# Patient Record
Sex: MALE | Race: WHITE | NOT HISPANIC OR LATINO | Employment: OTHER | URBAN - NONMETROPOLITAN AREA
[De-identification: names, ages, dates, MRNs, and addresses within clinical notes are randomized per-mention and may not be internally consistent; named-entity substitution may affect disease eponyms.]

---

## 2018-05-24 ENCOUNTER — OFFICE VISIT CONVERTED (OUTPATIENT)
Dept: FAMILY MEDICINE CLINIC | Age: 73
End: 2018-05-24
Attending: FAMILY MEDICINE

## 2018-11-16 ENCOUNTER — OFFICE VISIT CONVERTED (OUTPATIENT)
Dept: FAMILY MEDICINE CLINIC | Age: 73
End: 2018-11-16
Attending: FAMILY MEDICINE

## 2019-05-09 ENCOUNTER — HOSPITAL ENCOUNTER (OUTPATIENT)
Dept: SURGERY | Facility: HOSPITAL | Age: 74
Setting detail: HOSPITAL OUTPATIENT SURGERY
Discharge: HOME | End: 2019-05-09
Attending: OPHTHALMOLOGY

## 2019-05-29 ENCOUNTER — OFFICE VISIT CONVERTED (OUTPATIENT)
Dept: FAMILY MEDICINE CLINIC | Age: 74
End: 2019-05-29
Attending: NURSE PRACTITIONER

## 2019-05-29 ENCOUNTER — HOSPITAL ENCOUNTER (OUTPATIENT)
Dept: OTHER | Facility: HOSPITAL | Age: 74
Discharge: HOME | End: 2019-05-29

## 2019-06-04 ENCOUNTER — HOSPITAL ENCOUNTER (OUTPATIENT)
Dept: PHYSICAL THERAPY | Facility: CLINIC | Age: 74
Setting detail: RECURRING SERIES
Discharge: HOME | End: 2019-07-16

## 2019-06-27 ENCOUNTER — HOSPITAL ENCOUNTER (OUTPATIENT)
Dept: OTHER | Facility: HOSPITAL | Age: 74
Discharge: HOME | End: 2019-06-27
Attending: FAMILY MEDICINE

## 2019-06-27 ENCOUNTER — OFFICE VISIT CONVERTED (OUTPATIENT)
Dept: FAMILY MEDICINE CLINIC | Age: 74
End: 2019-06-27
Attending: FAMILY MEDICINE

## 2019-06-27 LAB
ALT SERPL-CCNC: 36 U/L (ref 10–40)
CHOLEST SERPL-MCNC: 130 MG/DL (ref 107–200)
CHOLEST/HDLC SERPL: 3.3 {RATIO} (ref 3–6)
EST. AVERAGE GLUCOSE BLD GHB EST-MCNC: 97 MG/DL
HBA1C MFR BLD: 5 % (ref 3.5–5.7)
HDLC SERPL-MCNC: 39 MG/DL (ref 40–60)
LDLC SERPL CALC-MCNC: 74 MG/DL (ref 70–100)
PSA SERPL-MCNC: 5.43 NG/ML (ref 0–4)
TRIGL SERPL-MCNC: 85 MG/DL (ref 40–150)
VLDLC SERPL-MCNC: 17 MG/DL (ref 5–37)

## 2019-09-05 ENCOUNTER — HOSPITAL ENCOUNTER (OUTPATIENT)
Dept: SURGERY | Facility: HOSPITAL | Age: 74
Setting detail: HOSPITAL OUTPATIENT SURGERY
Discharge: HOME | End: 2019-09-05
Attending: OPHTHALMOLOGY

## 2019-10-30 ENCOUNTER — HOSPITAL ENCOUNTER (OUTPATIENT)
Dept: OTHER | Facility: HOSPITAL | Age: 74
Discharge: HOME | End: 2019-10-30
Attending: FAMILY MEDICINE

## 2019-10-30 LAB
ALBUMIN SERPL-MCNC: 4.1 G/DL (ref 3.5–5)
ALBUMIN/GLOB SERPL: 1.6 {RATIO} (ref 1.4–2.6)
ALP SERPL-CCNC: 71 U/L (ref 56–155)
ALT SERPL-CCNC: 32 U/L (ref 10–40)
ANION GAP SERPL CALC-SCNC: 17 MMOL/L (ref 8–19)
AST SERPL-CCNC: 30 U/L (ref 15–50)
BILIRUB SERPL-MCNC: 0.76 MG/DL (ref 0.2–1.3)
BUN SERPL-MCNC: 15 MG/DL (ref 5–25)
BUN/CREAT SERPL: 15 {RATIO} (ref 6–20)
CALCIUM SERPL-MCNC: 10.1 MG/DL (ref 8.7–10.4)
CHLORIDE SERPL-SCNC: 103 MMOL/L (ref 99–111)
CONV CO2: 26 MMOL/L (ref 22–32)
CONV TOTAL PROTEIN: 6.7 G/DL (ref 6.3–8.2)
CREAT UR-MCNC: 1.03 MG/DL (ref 0.7–1.2)
GFR SERPLBLD BASED ON 1.73 SQ M-ARVRAT: >60 ML/MIN/{1.73_M2}
GLOBULIN UR ELPH-MCNC: 2.6 G/DL (ref 2–3.5)
GLUCOSE SERPL-MCNC: 97 MG/DL (ref 70–99)
OSMOLALITY SERPL CALC.SUM OF ELEC: 295 MOSM/KG (ref 273–304)
POTASSIUM SERPL-SCNC: 3.7 MMOL/L (ref 3.5–5.3)
SODIUM SERPL-SCNC: 142 MMOL/L (ref 135–147)

## 2019-10-31 LAB
PSA FREE MFR SERPL: 19.3 %
PSA FREE SERPL-MCNC: 0.87 NG/ML
PSA SERPL-MCNC: 4.5 NG/ML (ref 0–4)

## 2019-12-13 ENCOUNTER — OFFICE VISIT CONVERTED (OUTPATIENT)
Dept: FAMILY MEDICINE CLINIC | Age: 74
End: 2019-12-13
Attending: FAMILY MEDICINE

## 2020-06-08 ENCOUNTER — OFFICE VISIT CONVERTED (OUTPATIENT)
Dept: FAMILY MEDICINE CLINIC | Age: 75
End: 2020-06-08
Attending: FAMILY MEDICINE

## 2020-06-11 ENCOUNTER — HOSPITAL ENCOUNTER (OUTPATIENT)
Dept: OTHER | Facility: HOSPITAL | Age: 75
Discharge: HOME | End: 2020-06-11
Attending: FAMILY MEDICINE

## 2020-06-11 LAB
ALBUMIN SERPL-MCNC: 4.2 G/DL (ref 3.5–5)
ALBUMIN/GLOB SERPL: 1.4 {RATIO} (ref 1.4–2.6)
ALP SERPL-CCNC: 73 U/L (ref 56–155)
ALT SERPL-CCNC: 29 U/L (ref 10–40)
ANION GAP SERPL CALC-SCNC: 20 MMOL/L (ref 8–19)
AST SERPL-CCNC: 29 U/L (ref 15–50)
BILIRUB SERPL-MCNC: 0.84 MG/DL (ref 0.2–1.3)
BUN SERPL-MCNC: 19 MG/DL (ref 5–25)
BUN/CREAT SERPL: 18 {RATIO} (ref 6–20)
CALCIUM SERPL-MCNC: 9.9 MG/DL (ref 8.7–10.4)
CHLORIDE SERPL-SCNC: 108 MMOL/L (ref 99–111)
CHOLEST SERPL-MCNC: 130 MG/DL (ref 107–200)
CHOLEST/HDLC SERPL: 3.4 {RATIO} (ref 3–6)
CONV CO2: 22 MMOL/L (ref 22–32)
CONV TOTAL PROTEIN: 7.1 G/DL (ref 6.3–8.2)
CREAT UR-MCNC: 1.06 MG/DL (ref 0.7–1.2)
ERYTHROCYTE [DISTWIDTH] IN BLOOD BY AUTOMATED COUNT: 12.7 % (ref 11.5–14.5)
GFR SERPLBLD BASED ON 1.73 SQ M-ARVRAT: >60 ML/MIN/{1.73_M2}
GLOBULIN UR ELPH-MCNC: 2.9 G/DL (ref 2–3.5)
GLUCOSE SERPL-MCNC: 108 MG/DL (ref 70–99)
HBA1C MFR BLD: 16.4 G/DL (ref 14–18)
HCT VFR BLD AUTO: 48.4 % (ref 42–52)
HDLC SERPL-MCNC: 38 MG/DL (ref 40–60)
LDLC SERPL CALC-MCNC: 76 MG/DL (ref 70–100)
MCH RBC QN AUTO: 32.1 PG (ref 27–31)
MCHC RBC AUTO-ENTMCNC: 33.9 G/DL (ref 33–37)
MCV RBC AUTO: 94.7 FL (ref 80–96)
OSMOLALITY SERPL CALC.SUM OF ELEC: 305 MOSM/KG (ref 273–304)
PLATELET # BLD AUTO: 203 10*3/UL (ref 130–400)
PMV BLD AUTO: 10.8 FL (ref 7.4–10.4)
POTASSIUM SERPL-SCNC: 4.2 MMOL/L (ref 3.5–5.3)
PSA SERPL-MCNC: 4.92 NG/ML (ref 0–4)
RBC # BLD AUTO: 5.11 10*6/UL (ref 4.7–6.1)
SODIUM SERPL-SCNC: 146 MMOL/L (ref 135–147)
TRIGL SERPL-MCNC: 82 MG/DL (ref 40–150)
VLDLC SERPL-MCNC: 16 MG/DL (ref 5–37)
WBC # BLD AUTO: 5.42 10*3/UL (ref 4.8–10.8)

## 2020-06-12 LAB
EST. AVERAGE GLUCOSE BLD GHB EST-MCNC: 111 MG/DL
HBA1C MFR BLD: 5.5 % (ref 3.5–5.7)

## 2020-11-23 ENCOUNTER — HOSPITAL ENCOUNTER (OUTPATIENT)
Dept: OTHER | Facility: HOSPITAL | Age: 75
Discharge: HOME | End: 2020-11-23
Attending: FAMILY MEDICINE

## 2020-11-23 ENCOUNTER — OFFICE VISIT CONVERTED (OUTPATIENT)
Dept: FAMILY MEDICINE CLINIC | Age: 75
End: 2020-11-23
Attending: FAMILY MEDICINE

## 2020-11-23 LAB
ALBUMIN SERPL-MCNC: 4.1 G/DL (ref 3.5–5)
ALBUMIN/GLOB SERPL: 1.5 {RATIO} (ref 1.4–2.6)
ALP SERPL-CCNC: 66 U/L (ref 56–155)
ALT SERPL-CCNC: 45 U/L (ref 10–40)
ANION GAP SERPL CALC-SCNC: 19 MMOL/L (ref 8–19)
AST SERPL-CCNC: 34 U/L (ref 15–50)
BILIRUB SERPL-MCNC: 0.65 MG/DL (ref 0.2–1.3)
BUN SERPL-MCNC: 13 MG/DL (ref 5–25)
BUN/CREAT SERPL: 13 {RATIO} (ref 6–20)
CALCIUM SERPL-MCNC: 9.4 MG/DL (ref 8.7–10.4)
CHLORIDE SERPL-SCNC: 102 MMOL/L (ref 99–111)
CHOLEST SERPL-MCNC: 130 MG/DL (ref 107–200)
CHOLEST/HDLC SERPL: 3.2 {RATIO} (ref 3–6)
CONV CO2: 24 MMOL/L (ref 22–32)
CONV TOTAL PROTEIN: 6.8 G/DL (ref 6.3–8.2)
CREAT UR-MCNC: 1.04 MG/DL (ref 0.7–1.2)
GFR SERPLBLD BASED ON 1.73 SQ M-ARVRAT: >60 ML/MIN/{1.73_M2}
GLOBULIN UR ELPH-MCNC: 2.7 G/DL (ref 2–3.5)
GLUCOSE SERPL-MCNC: 109 MG/DL (ref 70–99)
HDLC SERPL-MCNC: 41 MG/DL (ref 40–60)
LDLC SERPL CALC-MCNC: 64 MG/DL (ref 70–100)
OSMOLALITY SERPL CALC.SUM OF ELEC: 293 MOSM/KG (ref 273–304)
POTASSIUM SERPL-SCNC: 3.8 MMOL/L (ref 3.5–5.3)
PSA SERPL-MCNC: 5.25 NG/ML (ref 0–4)
SODIUM SERPL-SCNC: 141 MMOL/L (ref 135–147)
TRIGL SERPL-MCNC: 123 MG/DL (ref 40–150)
VLDLC SERPL-MCNC: 25 MG/DL (ref 5–37)

## 2020-11-24 LAB
EST. AVERAGE GLUCOSE BLD GHB EST-MCNC: 128 MG/DL
HBA1C MFR BLD: 6.1 % (ref 3.5–5.7)

## 2021-05-13 ENCOUNTER — OFFICE VISIT CONVERTED (OUTPATIENT)
Dept: FAMILY MEDICINE CLINIC | Age: 76
End: 2021-05-13
Attending: FAMILY MEDICINE

## 2021-05-13 ENCOUNTER — HOSPITAL ENCOUNTER (OUTPATIENT)
Dept: OTHER | Facility: HOSPITAL | Age: 76
Discharge: HOME | End: 2021-05-13
Attending: FAMILY MEDICINE

## 2021-05-13 LAB
ALBUMIN SERPL-MCNC: 4.2 G/DL (ref 3.5–5)
ALBUMIN/GLOB SERPL: 1.5 {RATIO} (ref 1.4–2.6)
ALP SERPL-CCNC: 59 U/L (ref 56–155)
ALT SERPL-CCNC: 38 U/L (ref 10–40)
ANION GAP SERPL CALC-SCNC: 19 MMOL/L (ref 8–19)
AST SERPL-CCNC: 36 U/L (ref 15–50)
BILIRUB SERPL-MCNC: 0.61 MG/DL (ref 0.2–1.3)
BUN SERPL-MCNC: 13 MG/DL (ref 5–25)
BUN/CREAT SERPL: 12 {RATIO} (ref 6–20)
CALCIUM SERPL-MCNC: 9.3 MG/DL (ref 8.7–10.4)
CHLORIDE SERPL-SCNC: 106 MMOL/L (ref 99–111)
CONV CO2: 24 MMOL/L (ref 22–32)
CONV TOTAL PROTEIN: 7 G/DL (ref 6.3–8.2)
CREAT UR-MCNC: 1.13 MG/DL (ref 0.7–1.2)
ERYTHROCYTE [DISTWIDTH] IN BLOOD BY AUTOMATED COUNT: 12.6 % (ref 11.5–14.5)
GFR SERPLBLD BASED ON 1.73 SQ M-ARVRAT: >60 ML/MIN/{1.73_M2}
GLOBULIN UR ELPH-MCNC: 2.8 G/DL (ref 2–3.5)
GLUCOSE SERPL-MCNC: 111 MG/DL (ref 70–99)
HBA1C MFR BLD: 16.1 G/DL (ref 14–18)
HCT VFR BLD AUTO: 46.7 % (ref 42–52)
MCH RBC QN AUTO: 32.8 PG (ref 27–31)
MCHC RBC AUTO-ENTMCNC: 34.5 G/DL (ref 33–37)
MCV RBC AUTO: 95.1 FL (ref 80–96)
OSMOLALITY SERPL CALC.SUM OF ELEC: 301 MOSM/KG (ref 273–304)
PLATELET # BLD AUTO: 180 10*3/UL (ref 130–400)
PMV BLD AUTO: 10.3 FL (ref 7.4–10.4)
POTASSIUM SERPL-SCNC: 3.8 MMOL/L (ref 3.5–5.3)
PSA SERPL-MCNC: 5.93 NG/ML (ref 0–4)
RBC # BLD AUTO: 4.91 10*6/UL (ref 4.7–6.1)
SODIUM SERPL-SCNC: 145 MMOL/L (ref 135–147)
WBC # BLD AUTO: 5.6 10*3/UL (ref 4.8–10.8)

## 2021-05-18 NOTE — PROGRESS NOTES
Magnus Rousseau  1945     Office/Outpatient Visit    Visit Date: Mon, Jun 8, 2020 02:46 pm    Provider: Marco Verdin MD (Assistant: Michelle Soto RN)    Location: [facility]        Electronically signed by Marco Verdin MD on  06/09/2020 07:32:06 AM                             Subjective:        CC: insomnia, blood pressure, cholesterol    HPI:       Magnus is in today for follow up on insomnia.  He has been on Ambien for over 10 years.  He tolerates this well and feels it is necessarily for ongoing function and sleep.  He has tried to do without it previously, but he does not sleep well.    He currently takes 1/2 tablet at night.  He denies side effects.  He denies abuse, diversion, or doctor shopping.  Dutch is okay.          With regard to the mixed hyperlipidemia, current treatment includes Zocor.  Compliance with treatment has been good; he takes his medication as directed and follows up as directed.  He denies experiencing any hypercholesterolemia related symptoms.            Dx with essential (primary) hypertension; his current cardiac medication regimen includes a diuretic ( Hydrodiuril ).  Review of his blood pressure log reveals systolics in the 130s to 140s.  He is tolerating the medication well without side effects.  Compliance with treatment has been good; he takes his medication as directed and follows up as directed.      ROS:     CONSTITUTIONAL:  Negative for chills and fever.      CARDIOVASCULAR:  Negative for chest pain and palpitations.      RESPIRATORY:  Negative for recent cough and dyspnea.      GASTROINTESTINAL:  Negative for abdominal pain, nausea and vomiting.      INTEGUMENTARY:  Negative for atypical mole(s) and rash.          Past Medical History / Family History / Social History:         Last Reviewed on 6/08/2020 03:09 PM by Marco Verdin    Past Medical History:             PAST MEDICAL HISTORY         Hyperlipidemia    Hypertension      "Erectile Dysfunction     Insomnia         CURRENT MEDICAL PROVIDERS:    Dr. Simon - hemorrhoid             ADVANCED DIRECTIVES: None - His wife, Miranda, would make decisions if needed.         PREVENTIVE HEALTH MAINTENANCE             COLORECTAL CANCER SCREENING: Up to date (colonoscopy q10y; sigmoidoscopy q5y; Cologuard q3y) was last done 01/2020, Results are in chart; Cologuard normal         Surgical History:         Appendectomy     Pacemaker Implantation: 06/2019;     Rectal Fistula;         Family History:     Father: Coronary Artery Disease     Mother: Suicide     Brother(s): Cancer (unspecified type)         Social History:     Occupation:    Retired     Marital Status:      Children: 2 children         Tobacco/Alcohol/Supplements:     Last Reviewed on 6/08/2020 03:09 PM by Marco Verdin    Tobacco: He has never smoked.          Alcohol: Frequency:    \"I may drink...one beer a night\";         Substance Abuse History:     Last Reviewed on 6/08/2020 03:09 PM by Marco Verdin    NEGATIVE         Mental Health History:     Last Reviewed on 6/08/2020 03:09 PM by Marco Verdin        Communicable Diseases (eg STDs):     Last Reviewed on 6/08/2020 03:09 PM by Marco Verdin        Current Problems:     Last Reviewed on 6/08/2020 03:09 PM by Marco Verdin    Mixed hyperlipidemia    Essential (primary) hypertension    Other long term (current) drug therapy    Encounter for screening for malignant neoplasm of rectum    Encounter for screening for malignant neoplasm of prostate    Low back pain    Elevated prostate specific antigen [PSA]    Pain in right shoulder    Primary insomnia    Other specified joint disorders, right shoulder    Encounter for screening for malignant neoplasm of colon    Long term (current) use of anticoagulants        Immunizations:     Flulaval 10/13/2010    Fluzone (3 + years dose) 9/26/2011    Fluzone (3 + years dose) 9/21/2012    Fluzone " High-Dose pf (>=65 yr) 10/11/2013    Fluzone High-Dose pf (>=65 yr) 10/23/2014    Fluzone High-Dose pf (>=65 yr) 10/30/2015    Fluzone High-Dose pf (>=65 yr) 10/20/2016    Fluzone High-Dose pf (>=65 yr) 11/6/2017    Fluzone High-Dose pf (>=65 yr) 10/1/2018    Fluzone High-Dose pf (>=65 yr) 10/30/2019    Pneumococcal, 23-valent IM/SC (adult and pt >=2yr) 3/5/2011        Allergies:     Last Reviewed on 6/08/2020 03:09 PM by Marco Verdin    No Known Allergies.        Current Medications:     Last Reviewed on 6/08/2020 03:09 PM by Marco Verdin    zolpidem 10 mg oral tablet [Take 1 tablet(s) by mouth at bedtime prn]    Simvastatin 40 mg oral tablet [Take 1 tablet(s) by mouth at bedtime]    hydroCHLOROthiazide 25 mg oral tablet [Take 1 tablet(s) by mouth daily]    WARFARIN     TAB 2.5MG  [5mg mon/thur...2.5mg rest]    WARFARIN     TAB 5MG  [on Mon/Thur]        Objective:        Vitals:         Current: 6/8/2020 2:51:27 PM    Ht:  5 ft, 8.5 in;  Wt: 209 lbs;  BMI: 31.3T: 97.9 F (oral);  BP: 151/76 mm Hg (right arm, sitting);  P: 79 bpm (right arm (BP Cuff), sitting);  sCr: 1.03 mg/dL;  GFR: 62.51        Repeat:     3:16:38 PM  BP:   145/83mm Hg (right arm, sitting, pulse-75)     Exams:     PHYSICAL EXAM:     GENERAL: Vitals recorded well developed, well nourished;     EYES: extraocular movements intact; conjunctiva and cornea are normal; PERRL;     NECK: range of motion is normal; thyroid is non-palpable;     RESPIRATORY: normal respiratory rate and pattern with no distress; normal breath sounds with no rales, rhonchi, wheezes or rubs;     CARDIOVASCULAR: normal rate; rhythm is regular;  no systolic murmur;     GASTROINTESTINAL: nontender; normal bowel sounds; no masses;     LYMPHATIC: no enlargement of cervical or facial nodes; no supraclavicular nodes;     SKIN:  no significant rashes or lesions; no suspicious moles;     NEUROLOGIC: mental status: alert and oriented x 3; cranial nerves II-XII grossly  intact;     PSYCHIATRIC: appropriate affect and demeanor; normal psychomotor function;         Lab/Test Results:         Urine temperature: confirmed (06/08/2020),     All urine drug screen levels confirmed negative: yes (06/08/2020),     Date and time of last pill: ambien-6/7/20 @ 11pm/david (06/08/2020),     Performed by: pr (06/08/2020),     Collection Time: 15:22 (06/08/2020),             Assessment:         Z79.899   Other long term (current) drug therapy       F51.01   Primary insomnia       E78.2   Mixed hyperlipidemia       I10   Essential (primary) hypertension       Z79.01   Long term (current) use of anticoagulants       R97.20   Elevated prostate specific antigen [PSA]           ORDERS:         Meds Prescribed:       [Refilled] zolpidem 10 mg oral tablet [Take 1 tablet(s) by mouth at bedtime prn], #90 (ninety) tablets, Refills: 0 (zero)       [Refilled] hydroCHLOROthiazide 25 mg oral tablet [Take 1 tablet(s) by mouth daily], #90 (ninety) tablets, Refills: 1 (one)       [Refilled] Simvastatin 40 mg oral tablet [Take 1 tablet(s) by mouth at bedtime], #90 (ninety) tablets, Refills: 1 (one)         Radiology/Test Orders:       3017F  Colorectal CA screen results documented and reviewed (PV)  (In-House)              Lab Orders:       23043  Drug test prsmv qual dir optical obs per day  (In-House)            71094  HTNLP - [facility] CMP AND LIPID: [id number]  (Send-Out)            52911  BDCB2 - [facility] CBC w/o diff  (Send-Out)            *  PRSAS Medicare screening PSA  (Send-Out)              Other Orders:       1101F  Pt screen for fall risk; document no falls in past year or only 1 fall w/o injury in past year (NELDA)  (In-House)                      Plan:         Other long term (current) drug therapy    LABORATORY:  Labs ordered to be performed today include Drug screen.      RECOMMENDATIONS given include: Today, we have reviewed Magnus's care.  I'm going to refill his medications as noted below.  No changes are  anticipated.  We will update labs.  Drug screen today.  I do not see an opportunity for dose reduction presently..  MIPS Has had no falls or only one fall without injury in the past year Vaccines Flu and Pneumonia updated in Shot record Colorectal Cancer Screening is up to date and the results are in the chart           Prescriptions:       [Refilled] zolpidem 10 mg oral tablet [Take 1 tablet(s) by mouth at bedtime prn], #90 (ninety) tablets, Refills: 0 (zero)       [Refilled] hydroCHLOROthiazide 25 mg oral tablet [Take 1 tablet(s) by mouth daily], #90 (ninety) tablets, Refills: 1 (one)       [Refilled] Simvastatin 40 mg oral tablet [Take 1 tablet(s) by mouth at bedtime], #90 (ninety) tablets, Refills: 1 (one)           Orders:       [id number]  Drug test prsmv qual dir optical obs per day  (In-House)            1101F  Pt screen for fall risk; document no falls in past year or only 1 fall w/o injury in past year (NELDA)  (In-House)            3017F  Colorectal CA screen results documented and reviewed (PV)  (In-House)              Primary insomniaAs above.        Mixed hyperlipidemia    LABORATORY:  Labs ordered to be performed today include HTN/Lipid Panel: CMP, Lipid.            Orders:       81817  HTN - [facility] CMP AND LIPID: [id number]  (Send-Out)              Long term (current) use of anticoagulants    LABORATORY:  Labs ordered to be performed today include CBC W/O DIFF.            Orders:       [id number]  BDCB2 - [facility] CBC w/o diff  (Send-Out)              Elevated prostate specific antigen [PSA]    LABORATORY:  Labs ordered to be performed today include PSA.            Orders:       *  PRSAS Medicare screening PSA  (Send-Out)                  Charge Capture:         Primary Diagnosis:     Z79.899  Other long term (current) drug therapy           Orders:      09403  Office/outpatient visit; established patient, level 4  (In-House)            76669  Drug test prsmv qual dir optical obs per day  (In-House)             1101F  Pt screen for fall risk; document no falls in past year or only 1 fall w/o injury in past year (NELDA)  (In-House)            3017F  Colorectal CA screen results documented and reviewed (PV)  (In-House)              F51.01  Primary insomnia     E78.2  Mixed hyperlipidemia     I10  Essential (primary) hypertension     Z79.01  Long term (current) use of anticoagulants     R97.20  Elevated prostate specific antigen [PSA]

## 2021-05-18 NOTE — PROGRESS NOTES
Magnus Rousseau  1945     Office/Outpatient Visit    Visit Date: Mon, Nov 23, 2020 08:53 am    Provider: Marco Verdin MD (Assistant: Breana Polk LPN)    Location: [facility]        Electronically signed by Marco Verdin MD on  11/24/2020 07:38:02 AM                             Subjective:        CC: blood pressure, cholesterol, insomnia, a fib    HPI:           Patient presents with mixed hyperlipidemia.  Current treatment includes Crestor and Zocor.  Compliance with treatment has been good; he takes his medication as directed and follows up as directed.  He denies experiencing any hypercholesterolemia related symptoms.            Concerning essential (primary) hypertension, his current cardiac medication regimen includes a diuretic ( Hydrodiuril ) and a calcium channel blocker ( Norvasc ).  Review of his blood pressure log reveals systolics in the 130s to 140s.  He is tolerating the medication well without side effects.  Compliance with treatment has been good; he takes his medication as directed and follows up as directed.            Magnus is also being seen today for follow up on insomnia.  He has been on Ambien for over 10 years.  He tolerates this well and feels it is necessarily for ongoing function and sleep.  He has tried to do without it previously, but he does not sleep well.    He currently takes 1/2 tablet at night.  He denies side effects.  He denies abuse, diversion, or doctor shopping.  Dutch is okay.          Dx with unspecified atrial fibrillation; he is here for routine follow-up for atrial fibrillation. Current related medications include coumadin.   He is extremely compliant with his medication regimen.            Magnus also has history of elevated PSA and is due for recheck on this presently.    ROS:     CONSTITUTIONAL:  Negative for chills and fever.      CARDIOVASCULAR:  Negative for chest pain and palpitations.      RESPIRATORY:  Negative for recent cough  "and dyspnea.      GASTROINTESTINAL:  Negative for abdominal pain, nausea and vomiting.      INTEGUMENTARY:  Negative for atypical mole(s) and rash.          Past Medical History / Family History / Social History:         Last Reviewed on 11/23/2020 08:59 AM by Marco Verdin    Past Medical History:             PAST MEDICAL HISTORY         Hyperlipidemia    Hypertension     Erectile Dysfunction     Insomnia         CURRENT MEDICAL PROVIDERS:    Dr. Simon - hemorrhoid             ADVANCED DIRECTIVES: None - His wife, Pat, would make decisions if needed.         PREVENTIVE HEALTH MAINTENANCE             COLORECTAL CANCER SCREENING: Up to date (colonoscopy q10y; sigmoidoscopy q5y; Cologuard q3y) was last done 01/2020, Results are in chart; Cologuard normal         Surgical History:         Appendectomy     Pacemaker Implantation: 06/2019;     Rectal Fistula;         Family History:     Father: Coronary Artery Disease     Mother: Suicide     Brother(s): Cancer (unspecified type)         Social History:     Occupation:    Retired     Marital Status:      Children: 2 children         Tobacco/Alcohol/Supplements:     Last Reviewed on 11/23/2020 08:59 AM by Marco Verdin    Tobacco: He has never smoked.          Alcohol: Frequency:    \"I may drink...one beer a night\";         Substance Abuse History:     Last Reviewed on 11/23/2020 08:59 AM by Marco Verdin    NEGATIVE         Mental Health History:     Last Reviewed on 11/23/2020 08:59 AM by Marco Verdin        Communicable Diseases (eg STDs):     Last Reviewed on 11/23/2020 08:59 AM by Marco Verdin        Current Problems:     Last Reviewed on 11/23/2020 08:59 AM by Marco Verdin    Mixed hyperlipidemia    Essential (primary) hypertension    Other long term (current) drug therapy    Encounter for screening for malignant neoplasm of rectum    Encounter for screening for malignant neoplasm of prostate    Low " back pain    Elevated prostate specific antigen [PSA]    Pain in right shoulder    Primary insomnia    Other specified joint disorders, right shoulder    Encounter for screening for malignant neoplasm of colon    Long term (current) use of anticoagulants    Encounter for immunization        Immunizations:     influenza, high-dose, quadrivalent (FLUZONE HIGH-DOSE QUAD 2020-21) 10/1/2020    Flulaval 10/13/2010    Fluzone (3 + years dose) 9/26/2011    Fluzone (3 + years dose) 9/21/2012    Fluzone High-Dose pf (>=65 yr) 10/11/2013    Fluzone High-Dose pf (>=65 yr) 10/23/2014    Fluzone High-Dose pf (>=65 yr) 10/30/2015    Fluzone High-Dose pf (>=65 yr) 10/20/2016    Fluzone High-Dose pf (>=65 yr) 11/6/2017    Fluzone High-Dose pf (>=65 yr) 10/1/2018    Fluzone High-Dose pf (>=65 yr) 10/30/2019    Pneumococcal, 23-valent IM/SC (adult and pt >=2yr) 3/5/2011        Allergies:     Last Reviewed on 11/23/2020 08:59 AM by Marco Verdin    No Known Allergies.        Current Medications:     Last Reviewed on 11/23/2020 08:59 AM by Marco Verdin    zolpidem 10 mg oral tablet [Take 1 tablet(s) by mouth at bedtime prn]    hydroCHLOROthiazide 25 mg oral tablet [Take 1 tablet(s) by mouth daily]    WARFARIN     TAB 2.5MG  [5mg mon/thur...2.5mg rest]    WARFARIN     TAB 5MG  [on Mon/Thur]    rosuvastatin 20 mg oral tablet [take 1 tablet (20 mg) by oral route once daily]    AMLODIPINE   TAB 2.5MG        Objective:        Vitals:         Current: 11/23/2020 8:58:29 AM    Ht:  5 ft, 8.5 in;  Wt: 213.6 lbs;  BMI: 32.0T: 97.1 F (temporal);  BP: 140/77 mm Hg (right arm, sitting);  P: 74 bpm (right arm (BP Cuff), sitting);  sCr: 1.06 mg/dL;  GFR: 61.30        Exams:     PHYSICAL EXAM:     GENERAL: Vitals recorded well developed, well nourished;     EYES: extraocular movements intact; conjunctiva and cornea are normal; PERRL;     E/N/T: EARS:  normal external auditory canals and tympanic membranes;  grossly normal hearing;  OROPHARYNX:  normal mucosa, dentition, gingiva, and posterior pharynx;     NECK: range of motion is normal; thyroid is non-palpable;     RESPIRATORY: normal respiratory rate and pattern with no distress; normal breath sounds with no rales, rhonchi, wheezes or rubs;     CARDIOVASCULAR: normal rate; rhythm is regular;  no systolic murmur;     GASTROINTESTINAL: nontender; normal bowel sounds; no masses;     LYMPHATIC: no enlargement of cervical or facial nodes; no supraclavicular nodes;     SKIN:  no significant rashes or lesions; no suspicious moles;     NEUROLOGIC: mental status: alert and oriented x 3; cranial nerves II-XII grossly intact;     PSYCHIATRIC: appropriate affect and demeanor; normal psychomotor function;         Assessment:         E78.2   Mixed hyperlipidemia       I10   Essential (primary) hypertension       Z79.899   Other long term (current) drug therapy       F51.01   Primary insomnia       I48.91   Unspecified atrial fibrillation       R97.20   Elevated prostate specific antigen [PSA]           ORDERS:         Meds Prescribed:       [Refilled] zolpidem 10 mg oral tablet [Take 1 tablet(s) by mouth at bedtime prn], #90 (ninety) tablets, Refills: 0 (zero)       [Refilled] hydroCHLOROthiazide 25 mg oral tablet [Take 1 tablet(s) by mouth daily], #90 (ninety) tablets, Refills: 3 (three)       [Refilled] rosuvastatin 20 mg oral tablet [take 1 tablet (20 mg) by oral route once daily], #90 (ninety) tablets, Refills: 3 (three)         Radiology/Test Orders:       3017F  Colorectal CA screen results documented and reviewed (PV)  (In-House)              Other Orders:       1101F  Pt screen for fall risk; document no falls in past year or only 1 fall w/o injury in past year (NELDA)  (In-House)            1124F  Advance Care Planning discussed and doc in MR; no surrogate named or advance care plan provided  (Send-Out)                      Plan:         Mixed hyperlipidemia        RECOMMENDATIONS given include:  Magnus seems well today.  We have reviewed his care in detail.  I'm going to refill needed medications for him and update labs.  He already has the lab order.  Ambien will be refilled for him presently.  He tolerates it well and is not having obvious side effects.  We will continue to follow closely and have again discussed risks..  ADAMS Has had no falls or only one fall without injury in the past year Vaccines Flu and Pneumonia updated in Shot record Colorectal Cancer Screening is up to date and the results are in the chart Advance Directive/Surrogate Decision Maker discussed and pt declines to complete today           Prescriptions:       [Refilled] zolpidem 10 mg oral tablet [Take 1 tablet(s) by mouth at bedtime prn], #90 (ninety) tablets, Refills: 0 (zero)       [Refilled] hydroCHLOROthiazide 25 mg oral tablet [Take 1 tablet(s) by mouth daily], #90 (ninety) tablets, Refills: 3 (three)       [Refilled] rosuvastatin 20 mg oral tablet [take 1 tablet (20 mg) by oral route once daily], #90 (ninety) tablets, Refills: 3 (three)           Orders:       1101F  Pt screen for fall risk; document no falls in past year or only 1 fall w/o injury in past year (NELDA)  (In-House)            3017F  Colorectal CA screen results documented and reviewed (PV)  (In-House)            1124F  Advance Care Planning discussed and doc in MR; no surrogate named or advance care plan provided  (Send-Out)              Essential (primary) hypertensionAs above.        Other long term (current) drug therapyAs above.        Primary insomniaAs above.        Unspecified atrial fibrillationAs above.        Elevated prostate specific antigen [PSA]As above.            Charge Capture:         Primary Diagnosis:     E78.2  Mixed hyperlipidemia           Orders:      64803  Office/outpatient visit; established patient, level 4  (In-House)            1101F  Pt screen for fall risk; document no falls in past year or only 1 fall w/o injury in past year (NELDA)   (In-House)            3017F  Colorectal CA screen results documented and reviewed (PV)  (In-House)              I10  Essential (primary) hypertension     Z79.899  Other long term (current) drug therapy     F51.01  Primary insomnia     I48.91  Unspecified atrial fibrillation     R97.20  Elevated prostate specific antigen [PSA]

## 2021-05-18 NOTE — PROGRESS NOTES
Magnus Rousseau 1945     Office/Outpatient Visit    Visit Date: Thu, Jun 27, 2019 09:49 am    Provider: Marco Verdin MD (Assistant: Michelle Soto RN)    Location: [facility]        Electronically signed by Marco Verdin MD on  06/28/2019 05:35:29 AM                             SUBJECTIVE:        CC: blood pressure, cholesterol, sleep         HPI:         Mr. Rousseau presents with hypertension.  His current cardiac medication regimen includes a diuretic ( Hydrodiuril ).  Review of his blood pressure log reveals systolics in the 130s to 140s.  He is tolerating the medication well without side effects.  Compliance with treatment has been good; he takes his medication as directed and follows up as directed.          Concerning hypercholesterolemia, current treatment includes Zocor.  Compliance with treatment has been good; he takes his medication as directed and follows up as directed.  He denies experiencing any hypercholesterolemia related symptoms.          Magnus is also in today for follow up on insomnia.  He has been on Ambien for aobut 10 years.  He tolerates this well and feels it is necessarily for ongoing function and sleep.  He has tried to do without it previously, but he does not sleep well.    He currently takes 1/2 tablet at night.  He denies side effects.  He denies abuse, diversion, or doctor shopping.  Dutch is okay.     ROS:     CONSTITUTIONAL:  Negative for chills and fever.      CARDIOVASCULAR:  Negative for chest pain and palpitations.      RESPIRATORY:  Negative for recent cough and dyspnea.      GASTROINTESTINAL:  Negative for abdominal pain, nausea and vomiting.          PMH/FMH/SH:     Last Reviewed on 6/27/2019 10:16 AM by Marco Verdin    Past Medical History:             PAST MEDICAL HISTORY         Hyperlipidemia    Hypertension     Erectile Dysfunction     Insomnia         CURRENT MEDICAL PROVIDERS:    Dr. Simon - hemorrhoid         PREVENTIVE HEALTH  "MAINTENANCE             COLORECTAL CANCER SCREENING: colonoscopy with normal results; November 4, 2009         Surgical History:         Appendectomy      Pacemaker Implantation: 06/2019;     Rectal Fistula;         Family History:         Positive for Myocardial Infarction ( father ).      Positive for Suicide - mother.          Social History:     Occupation:    Retired     Marital Status:      Children: 2 children         Tobacco/Alcohol/Supplements:     Last Reviewed on 6/27/2019 10:16 AM by Marco Verdin    Tobacco: He has never smoked.          Alcohol: Frequency:    \"I may drink...one beer a night\";         Substance Abuse History:     Last Reviewed on 6/27/2019 10:16 AM by Marco Verdin    NEGATIVE         Mental Health History:     Last Reviewed on 6/27/2019 10:16 AM by Marco Verdin        Communicable Diseases (eg STDs):     Last Reviewed on 6/27/2019 10:16 AM by Marco Verdin            Current Problems:     Last Reviewed on 6/27/2019 10:16 AM by Marco Verdin    Scapulalgia     Chronic insomnia     Low back pain     Essential hypertension     Use of high risk medications     Malaise and Fatigue     Dizziness     Hypertension     Hypercholesterolemia     Erectile dysfunction due to organic reasons     Shoulder pain     Elevated prostate specific antigen (PSA)     Screening for prostate cancer     Screening for rectal cancer         Immunizations:     Flulaval 10/13/2010     Fluzone (3 + years dose) 9/26/2011     Fluzone (3 + years dose) 9/21/2012     Fluzone High-Dose pf (>=65 yr) 10/11/2013     Fluzone High-Dose pf (>=65 yr) 10/23/2014     Fluzone High-Dose pf (>=65 yr) 10/30/2015     Fluzone High-Dose pf (>=65 yr) 10/20/2016     Fluzone High-Dose pf (>=65 yr) 11/6/2017     Fluzone High-Dose pf (>=65 yr) 10/1/2018     Pneumococcal, 23-valent IM/SC (adult and pt >=2yr) 3/5/2011         Allergies:     Last Reviewed on 6/27/2019 10:16 AM by Lambert, " Marco Estrada      No Known Drug Allergies.         Current Medications:     Last Reviewed on 6/27/2019 10:16 AM by Marco Verdin    Zolpidem Tartrate 10mg Tablet Take 1 tablet(s) by mouth at bedtime prn     Hydrochlorothiazide (HCTZ) 25mg Tablet Take 1 tablet(s) by mouth daily     Simvastatin 40mg Tablet Take 1 tablet(s) by mouth at bedtime     Prednisone 20mg Tablet 2 PO daily x 5 days     Robaxin 500mg Tablets 1 tab HS PRN     Aspirin (ASA) 325mg Tablet 1 tab daily         OBJECTIVE:        Vitals:         Current: 6/27/2019 9:55:44 AM    Ht:  5 ft, 8.5 in;  Wt: 206.4 lbs;  BMI: 30.9    T: 97.7 F (oral);  BP: 154/92 mm Hg (right arm, sitting);  P: 86 bpm (right arm (BP Cuff), sitting);  sCr: 1.15 mg/dL;  GFR: 56.52        Repeat:     9:58:52 AM     BP:   140/79mm Hg (right arm, sitting)         Exams:     PHYSICAL EXAM:     GENERAL: Vitals recorded well developed, well nourished;     EYES: extraocular movements intact; conjunctiva and cornea are normal; PERRL;     E/N/T: EARS:  normal external auditory canals and tympanic membranes;  grossly normal hearing; OROPHARYNX:  normal mucosa, dentition, gingiva, and posterior pharynx;     NECK: range of motion is normal; thyroid is non-palpable;     RESPIRATORY: normal respiratory rate and pattern with no distress; normal breath sounds with no rales, rhonchi, wheezes or rubs;     CARDIOVASCULAR: normal rate; rhythm is regular;  no systolic murmur;     GASTROINTESTINAL: nontender; normal bowel sounds; no masses;     LYMPHATIC: no enlargement of cervical or facial nodes; no supraclavicular nodes;     SKIN:  no significant rashes or lesions; no suspicious moles;     NEUROLOGIC: mental status: alert and oriented x 3; cranial nerves II-XII grossly intact;     PSYCHIATRIC: appropriate affect and demeanor; normal psychomotor function;         Lab/Test Results:             Urine temperature:  confirmed (06/27/2019),     All urine drug screen levels confirmed negative:   "yes (06/27/2019),     Date and time of last pill:  ambien -6/26/19 @ 11pm/amy (06/27/2019),     Performed by:  TAMMY (06/27/2019),     Collection Time:  1033 (06/27/2019),             ASSESSMENT           401.1   I10  Hypertension              DDx:     272.0   E78.2  Hypercholesterolemia              DDx:     V58.69   Z79.899  Use of high risk medications              DDx:     307.42   F51.01  Chronic insomnia              DDx:     790.93   R97.20  Elevated prostate specific antigen (PSA)              DDx:     790.6   R73.01  Hyperglycemia              DDx:         ORDERS:         Meds Prescribed:       Refill of: Zolpidem Tartrate 10mg Tablet Take 1 tablet(s) by mouth at bedtime prn  #90 (Ninety) tablet(s) Refills: 0       Refill of: Hydrochlorothiazide (HCTZ) 25mg Tablet Take 1 tablet(s) by mouth daily  #90 (Ninety) tablet(s) Refills: 1       Refill of: Simvastatin 40mg Tablet Take 1 tablet(s) by mouth at bedtime  #90 (Ninety) tablet(s) Refills: 1         Lab Orders:       31513  ALT - [facility] ALT (SGPT)  (Send-Out)         11605  LPDP - [facility] Lipid Panel  (Send-Out)         92451  PSA - [facility] PSA DIAGNOSTIC  (Send-Out)         61514  Drug test prsmv qual dir optical obs per day  (In-House)         68974  A1CEG - [facility] Hemoglobin A1C  (Send-Out)           Other Orders:         Depression screen negative  (In-House)           Negative EtOH screen  (In-House)         1101F  Pt screen for fall risk; document no falls in past year or only 1 fall w/o injury in past year (NELDA)  (In-House)                   PLAN:          Hypertension     Today, we have reviewed Magnus's care.  I\"m going to refill needed medications for him as noted including Ambien.  We will continue to follow closely moving forward.  Overall, he seems well - recent pacemaker aside.     MIPS PHQ-9 Depression Screening: Completed form scanned and in chart; Total Score 0; Negative Depression Screen Negative alcohol screen           Prescriptions:       " Refill of: Hydrochlorothiazide (HCTZ) 25mg Tablet Take 1 tablet(s) by mouth daily  #90 (Ninety) tablet(s) Refills: 1           Orders:         Depression screen negative  (In-House)           Negative EtOH screen  (In-House)         1101F  Pt screen for fall risk; document no falls in past year or only 1 fall w/o injury in past year (NELDA)  (In-House)             Patient Education Handouts:       High Blood Pressure (HTN)           Hypercholesterolemia     LABORATORY:  Labs ordered to be performed today include ALT and lipid panel.            Prescriptions:       Refill of: Simvastatin 40mg Tablet Take 1 tablet(s) by mouth at bedtime  #90 (Ninety) tablet(s) Refills: 1           Orders:       95975  ALT - [facility] ALT (SGPT)  (Send-Out)         02174  LPDP [facility] Lipid Panel  (Send-Out)            Use of high risk medications     LABORATORY:  Labs ordered to be performed today include Drug screen.            Orders:       17897  Drug test prsmv qual dir optical obs per day  (In-House)            Chronic insomnia           Prescriptions:       Refill of: Zolpidem Tartrate 10mg Tablet Take 1 tablet(s) by mouth at bedtime prn  #90 (Ninety) tablet(s) Refills: 0          Elevated prostate specific antigen (PSA)     LABORATORY:  Labs ordered to be performed today include PSA Diagnostic purpose.            Orders:       [id number]  PSA - [facility] PSA DIAGNOSTIC  (Send-Out)            Hyperglycemia     LABORATORY:  Labs ordered to be performed today include HgbA1C.            Orders:       05129  A1CEG - [facility] Hemoglobin A1C  (Send-Out)               CHARGE CAPTURE           **Please note: ICD descriptions below are intended for billing purposes only and may not represent clinical diagnoses**        Primary Diagnosis:         401.1 Hypertension            I10    Essential (primary) hypertension              Orders:          14230   Office/outpatient visit; established patient, level 4  (In-House)                Depression screen  negative  (In-House)                Negative EtOH screen  (In-House)             1101F   Pt screen for fall risk; document no falls in past year or only 1 fall w/o injury in past year (NELDA)  (In-House)           272.0 Hypercholesterolemia            E78.2    Mixed hyperlipidemia    V58.69 Use of high risk medications            Z79.899    Other long term (current) drug therapy              Orders:          53655   Drug test prsmv qual dir optical obs per day  (In-House)           307.42 Chronic insomnia            F51.01    Primary insomnia    790.93 Elevated prostate specific antigen (PSA)            R97.20    Elevated prostate specific antigen [PSA]    790.6 Hyperglycemia            R73.01    Impaired fasting glucose

## 2021-05-18 NOTE — PROGRESS NOTES
Magnus Rousseau 1945     Office/Outpatient Visit    Visit Date: Thu, May 24, 2018 02:48 pm    Provider: Marco Verdin MD (Assistant: Marcy Sweet MA)    Location: [facility]        Electronically signed by Marco Verdin MD on  05/25/2018 05:18:14 AM                             SUBJECTIVE:        CC: insomnia, blood pressure, cholesterol         HPI:     Magnus is in today for follow up on sleep issues.  He remains on Ambien for chronic insomnia.  He tolerates that well and feels it is necessarily for ongoing function and sleep.  He has tried to do without in the past, but he does not sleep well without it.     He is taking 1/2 tablet at night.  He denies side effects.  He denies abuse, diversion, or doctor shopping.  Dutch is okay.         With regard to the hypertension, his current cardiac medication regimen includes a diuretic ( Hydrodiuril ) and a calcium channel blocker ( Cardizem CD ).  He is tolerating the medication well without side effects.  Compliance with treatment has been good; he takes his medication as directed and follows up as directed.          Hypercholesterolemia details; current treatment includes Zocor.  Compliance with treatment has been good; he takes his medication as directed and follows up as directed.  He denies experiencing any hypercholesterolemia related symptoms.          Magnus is also in today for follow up on shoulder pain.  He has had some increased pain for the last 10 days maybe.  He is not having neck pain.  He did some activity that may have contributed to this.     ROS:     CONSTITUTIONAL:  Negative for chills and fever.      CARDIOVASCULAR:  Negative for chest pain and palpitations.      RESPIRATORY:  Negative for recent cough and dyspnea.      GASTROINTESTINAL:  Negative for abdominal pain, nausea and vomiting.          PMH/FMH/SH:     Last Reviewed on 5/24/2018 02:55 PM by Marco Verdin    Past Medical History:         Hyperlipidemia     "Hypertension     Erectile Dysfunction     Insomnia         CURRENT MEDICAL PROVIDERS:    Dr. Simon - hemorrhoid         Surgical History:         Appendectomy      Rectal Fistula;         Family History:         Positive for Myocardial Infarction ( father ).      Positive for Suicide - mother.          Social History:     Occupation:    Retired     Marital Status:      Children: 2 children         Tobacco/Alcohol/Supplements:     Last Reviewed on 5/24/2018 02:55 PM by Marco Verdin    Tobacco: He has never smoked.          Alcohol: Frequency:    \"I may drink...one beer a night\";         Substance Abuse History:     Last Reviewed on 5/24/2018 02:55 PM by Marco Verdin    NEGATIVE         Mental Health History:     Last Reviewed on 5/24/2018 02:55 PM by Marco Verdin        Communicable Diseases (eg STDs):     Last Reviewed on 5/24/2018 02:55 PM by Marco Verdin            Current Problems:     Last Reviewed on 5/24/2018 02:55 PM by Marco Verdin    Low back pain     Essential hypertension     Use of high risk medications     Malaise and Fatigue     Dizziness     Hypertension     Hypercholesterolemia     Erectile dysfunction due to organic reasons     Screening for prostate cancer     Screening for rectal cancer         Immunizations:     Flulaval 10/13/2010     Fluzone (3 + years dose) 9/26/2011     Fluzone (3 + years dose) 9/21/2012     Fluzone High-Dose pf (>=65 yr) 10/11/2013     Fluzone High-Dose pf (>=65 yr) 10/23/2014     Fluzone High-Dose pf (>=65 yr) 10/30/2015     Fluzone High-Dose pf (>=65 yr) 10/20/2016     Fluzone High-Dose pf (>=65 yr) 11/6/2017     Pneumococcal, 23-valent IM/SC (adult and pt >=2yr) 3/5/2011         Allergies:     Last Reviewed on 5/24/2018 02:55 PM by Marco Verdin      No Known Drug Allergies.         Current Medications:     Last Reviewed on 5/24/2018 02:55 PM by Marco Verdin    Diltiazem HCl 300mg Capsules, " Extended Release Take 1 capsule(s) by mouth daily     Hydrochlorothiazide (HCTZ) 25mg Tablet Take 1 tablet(s) by mouth daily     Simvastatin 40mg Tablet Take 1 tablet(s) by mouth at bedtime     Zolpidem Tartrate 10mg Tablet Take 1 tablet(s) by mouth at bedtime prn     Aspirin (ASA) 325mg Tablet 1 tab daily         OBJECTIVE:        Vitals:         Current: 5/24/2018 2:50:55 PM    Ht:  5 ft, 8.5 in;  Wt: 207 lbs;  BMI: 31.0    T: 97.7 F (oral);  BP: 147/74 mm Hg (right arm, sitting);  P: 62 bpm (right arm (BP Cuff), sitting);  sCr: 1.06 mg/dL;  GFR: 62.31        Repeat:     3:14:49 PM     BP:   126/74mm Hg (left arm, sitting)         Exams:     PHYSICAL EXAM:     GENERAL: Vitals recorded well developed, well nourished;     EYES: extraocular movements intact; conjunctiva and cornea are normal; PERRL;     E/N/T: EARS:  normal external auditory canals and tympanic membranes;  grossly normal hearing; OROPHARYNX:  normal mucosa, dentition, gingiva, and posterior pharynx;     NECK: range of motion is normal; thyroid is non-palpable;     RESPIRATORY: normal respiratory rate and pattern with no distress; normal breath sounds with no rales, rhonchi, wheezes or rubs;     CARDIOVASCULAR: normal rate; rhythm is regular;  no systolic murmur;     GASTROINTESTINAL: nontender; normal bowel sounds; no masses;     GENITOURINARY: prostate:  no nodules, tenderness, or enlargement;     SKIN:  no significant rashes or lesions; no suspicious moles;         Lab/Test Results:             Urine temperature:  comfirmed (05/24/2018),     All urine drug screen levels confirmed negative:  yes (05/24/2018),     Date and time of last pill:  Ambien 5/23/18 @ 11PM (05/24/2018),     Performed by:  SARA (05/24/2018),     Collection Time:  312PM (05/24/2018),             ASSESSMENT           V58.69   Z79.899  Use of high risk medications              DDx:     307.42   F51.01  Chronic insomnia              DDx:     401.1   I10  Hypertension               DDx:     272.0   E78.4  Hypercholesterolemia              DDx:     790.93   R97.20  Elevated prostate specific antigen (PSA)              DDx:     719.41   M25.511  Shoulder pain              DDx:         ORDERS:         Meds Prescribed:       Refill of: Diltiazem HCl 300mg Capsules, Extended Release Take 1 capsule(s) by mouth daily  #90 (Ninety) capsule(s) Refills: 1       Refill of: Hydrochlorothiazide (HCTZ) 25mg Tablet Take 1 tablet(s) by mouth daily  #90 (Ninety) tablet(s) Refills: 1       Refill of: Simvastatin 40mg Tablet Take 1 tablet(s) by mouth at bedtime  #90 (Ninety) tablet(s) Refills: 1       Refill of: Zolpidem Tartrate 10mg Tablet Take 1 tablet(s) by mouth at bedtime prn  #30 (Thirty) tablet(s) Refills: 2         Lab Orders:       [id number]  Drug test prsmv qual dir optical obs per day  (In-House)         32166  CK - [facility]- CK total  (Send-Out)         35960  HTNLP - [facility] CMP AND LIPID: [id number]  (Send-Out)         66850  TSH - [facility] TSH  (Send-Out)         55354  PSA - [facility] PSA DIAGNOSTIC  (Send-Out)                   PLAN:          Use of high risk medications     LABORATORY:  Labs ordered to be performed today include Drug screen.      RECOMMENDATIONS given include: Today, we have reviewed Magnus's care.  His pressure is just a bit high.  We will recheck it prior to his leaving.  Consider low dose ARB.  The prostate seems normal on exam today.  We will repeat a PSA when he has labs done.  My leaning is toward not pursuing additional evaluation other than periodic PSA.  He understands that it is possible there is a prostate cancer present.  However, it is likely given his age that it will never be a significant health risk.  The Ambien will be filled..            Orders:       [id number]  Drug test prsmv qual dir optical obs per day  (In-House)             Patient Education Handouts:       Controlled Prescription Drug education           Chronic insomnia As above.           Prescriptions:       Refill of:  Zolpidem Tartrate 10mg Tablet Take 1 tablet(s) by mouth at bedtime prn  #30 (Thirty) tablet(s) Refills: 2          Hypertension As above.           Prescriptions:       Refill of: Diltiazem HCl 300mg Capsules, Extended Release Take 1 capsule(s) by mouth daily  #90 (Ninety) capsule(s) Refills: 1       Refill of: Hydrochlorothiazide (HCTZ) 25mg Tablet Take 1 tablet(s) by mouth daily  #90 (Ninety) tablet(s) Refills: 1          Hypercholesterolemia As above.     LABORATORY:  Labs ordered to be performed today include CK, total, HTN/Lipid Panel: CMP, Lipid, and TSH.            Prescriptions:       Refill of: Simvastatin 40mg Tablet Take 1 tablet(s) by mouth at bedtime  #90 (Ninety) tablet(s) Refills: 1           Orders:       [id number]  CK - [facility]- CK total  (Send-Out)         05514  HTNLP - [facility] CMP AND LIPID: [id number], [id number]  (Send-Out)         58697  TSH - [facility] TSH  (Send-Out)            Elevated prostate specific antigen (PSA) As above.     LABORATORY:  Labs ordered to be performed today include PSA Diagnostic purpose.            Orders:       [id number]  PSA - [facility] PSA DIAGNOSTIC  (Send-Out)            Shoulder pain As above.             CHARGE CAPTURE           **Please note: ICD descriptions below are intended for billing purposes only and may not represent clinical diagnoses**        Primary Diagnosis:         V58.69 Use of high risk medications            Z79.899    Other long term (current) drug therapy              Orders:          93207   Office/outpatient visit; established patient, level 4  (In-House)             34567   Drug test prsmv qual dir optical obs per day  (In-House)           307.42 Chronic insomnia            F51.01    Primary insomnia    401.1 Hypertension            I10    Essential (primary) hypertension    272.0 Hypercholesterolemia            E78.4    Other hyperlipidemia    790.93 Elevated prostate specific antigen (PSA)            R97.20    Elevated prostate specific antigen [PSA]    719.41 Shoulder pain             M25.511    Pain in right shoulder
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
## 2021-05-18 NOTE — PROGRESS NOTES
Magnus Rousseau  1945     Office/Outpatient Visit    Visit Date: Thu, May 13, 2021 08:54 am    Provider: Marco Verdin MD (Assistant: Michelle Soto RN)    Location: [facility]        Electronically signed by Marco Verdin MD on  05/15/2021 06:23:55 AM                             Subjective:        CC: insomnia, blood pressure, cholesterol, elevated PSA    HPI:       Magnus is in today for follow up on sleep issues.  He has been on Ambien for some time and says that he is not able to sleep well without it.  He denies acute issue related to this or side effects. Dutch reviewed.          With regard to the mixed hyperlipidemia, current treatment includes Crestor.  Compliance with treatment has been good; he takes his medication as directed and follows up as directed.  He denies experiencing any hypercholesterolemia related symptoms.            Dx with essential (primary) hypertension; his current cardiac medication regimen includes a diuretic ( Hydrodiuril ) and a calcium channel blocker ( Norvasc ).  Review of his blood pressure log reveals systolics in the 130s to 140s.  He is tolerating the medication well without side effects.  Compliance with treatment has been good; he takes his medication as directed and follows up as directed.            He does have elevated PSA that we have been monitoring.  This has been stable.          Concerning unspecified atrial fibrillation, he is here for routine follow-up for atrial fibrillation. Current related medications include coumadin.   He is extremely compliant with his medication regimen.      ROS:     CONSTITUTIONAL:  Negative for chills and fever.      CARDIOVASCULAR:  Negative for chest pain and palpitations.      RESPIRATORY:  Negative for recent cough and dyspnea.      GASTROINTESTINAL:  Negative for abdominal pain, nausea and vomiting.      INTEGUMENTARY:  Negative for atypical mole(s) and rash.          Past Medical History / Family  "History / Social History:         Last Reviewed on 5/13/2021 09:05 AM by Marco Verdin    Past Medical History:             PAST MEDICAL HISTORY         Hyperlipidemia    Hypertension     Erectile Dysfunction     Insomnia         CURRENT MEDICAL PROVIDERS:    Dr. Simno - hemorrhoid             ADVANCED DIRECTIVES: None - His wife, Miranda, would make decisions if needed.         PREVENTIVE HEALTH MAINTENANCE             COLORECTAL CANCER SCREENING: Up to date (colonoscopy q10y; sigmoidoscopy q5y; Cologuard q3y) was last done 01/2020, Results are in chart; Cologuard normal         Surgical History:         Appendectomy     Pacemaker Implantation: 06/2019;     Rectal Fistula;         Family History:     Father: Coronary Artery Disease     Mother: Suicide     Brother(s): Cancer (unspecified type)         Social History:     Occupation:    Retired     Marital Status:      Children: 2 children         Tobacco/Alcohol/Supplements:     Last Reviewed on 5/13/2021 09:05 AM by Marco Verdin    Tobacco: He has never smoked.          Alcohol: Frequency:    \"I may drink...one beer a night\";         Substance Abuse History:     Last Reviewed on 5/13/2021 09:05 AM by Marco Verdin    NEGATIVE         Mental Health History:     Last Reviewed on 5/13/2021 09:05 AM by Marco Verdin        Communicable Diseases (eg STDs):     Last Reviewed on 5/13/2021 09:05 AM by Marco Verdin        Current Problems:     Last Reviewed on 5/13/2021 09:05 AM by Marco Verdin    Mixed hyperlipidemia    Essential (primary) hypertension    Other long term (current) drug therapy    Elevated prostate specific antigen [PSA]    Primary insomnia    Unspecified atrial fibrillation        Immunizations:     influenza, high-dose, quadrivalent (FLUZONE HIGH-DOSE QUAD 2020-21) 10/1/2020    Flulaval 10/13/2010    Fluzone (3 + years dose) 9/26/2011    Fluzone (3 + years dose) 9/21/2012    Fluzone High-Dose " pf (>=65 yr) 10/11/2013    Fluzone High-Dose pf (>=65 yr) 10/23/2014    Fluzone High-Dose pf (>=65 yr) 10/30/2015    Fluzone High-Dose pf (>=65 yr) 10/20/2016    Fluzone High-Dose pf (>=65 yr) 11/6/2017    Fluzone High-Dose pf (>=65 yr) 10/1/2018    Fluzone High-Dose pf (>=65 yr) 10/30/2019    Pneumococcal, 23-valent IM/SC (adult and pt >=2yr) 3/5/2011        Allergies:     Last Reviewed on 5/13/2021 09:05 AM by Marco Verdin    No Known Allergies.        Current Medications:     Last Reviewed on 5/13/2021 09:05 AM by Marco Verdin    zolpidem 10 mg oral tablet [Take 1 tablet(s) by mouth at bedtime prn]    hydroCHLOROthiazide 25 mg oral tablet [Take 1 tablet(s) by mouth daily]    WARFARIN     TAB 2.5MG  [5mg mon/thur...2.5mg rest]    WARFARIN     TAB 5MG  [on Mon/Thur]    rosuvastatin 20 mg oral tablet [take 1 tablet (20 mg) by oral route once daily]    AMLODIPINE   TAB 2.5MG         Objective:        Vitals:         Current: 5/13/2021 8:57:26 AM    Ht:  5 ft, 8.5 in;  Wt: 216.4 lbs;  BMI: 32.4T: 97.3 F (temporal);  BP: 136/84 mm Hg (right arm, sitting);  P: 77 bpm (right arm (BP Cuff), sitting);  sCr: 1.04 mg/dL;  GFR: 61.88        Exams:     PHYSICAL EXAM:     GENERAL: Vitals recorded well developed, well nourished;     NECK: range of motion is normal; thyroid is non-palpable;     RESPIRATORY: normal respiratory rate and pattern with no distress; normal breath sounds with no rales, rhonchi, wheezes or rubs;     CARDIOVASCULAR: normal rate; rhythm is regular;  no systolic murmur;     GASTROINTESTINAL: nontender; normal bowel sounds; no masses;     LYMPHATIC: no enlargement of cervical or facial nodes; no supraclavicular nodes;     SKIN:  no significant rashes or lesions; no suspicious moles;     NEUROLOGIC: mental status: alert and oriented x 3; cranial nerves II-XII grossly intact;     PSYCHIATRIC: appropriate affect and demeanor; normal psychomotor function;         Lab/Test Results:          Urine temperature: confirmed (05/13/2021),     All urine drug screen levels confirmed negative: yes (05/13/2021),     Date and time of last pill: ambien-5/12/21 @ 11pm/david (05/13/2021),     Performed by: tls (05/13/2021),     Collection Time: 0915 (05/13/2021),             Assessment:         F51.01   Primary insomnia       E78.2   Mixed hyperlipidemia       I10   Essential (primary) hypertension       Z79.899   Other long term (current) drug therapy       R97.20   Elevated prostate specific antigen [PSA]       I48.91   Unspecified atrial fibrillation           ORDERS:         Meds Prescribed:       [Refilled] zolpidem 5 mg oral tablet [take 1 tablet (5 mg) by oral route once daily at bedtime], #90 (ninety) tablets, Refills: 1 (one)       [Refilled] hydroCHLOROthiazide 25 mg oral tablet [Take 1 tablet(s) by mouth daily], #90 (ninety) tablets, Refills: 3 (three)       [Refilled] rosuvastatin 20 mg oral tablet [take 1 tablet (20 mg) by oral route once daily], #90 (ninety) tablets, Refills: 3 (three)         Radiology/Test Orders:       [id number]  Colorectal CA screen results documented and reviewed (PV)  (In-House)              Lab Orders:       [id number]  COMP - [facility] Comp. Metabolic Panel  (Send-Out)            [id number]  PSA - [facility] PSA DIAGNOSTIC  (Send-Out)            [id number]  BDCB2 - [facility] CBC w/o diff  (Send-Out)            [id number]  Drug test prsmv qual dir optical obs per day  (In-House)              Other Orders:       [id number]  Advance Care Planning discussed and doc in MR; no surrogate named or advance care plan provided  (Send-Out)                      Plan:         Primary insomnia        RECOMMENDATIONS given include: Magnus is doing well.  We will decrease the dose on Ambien to reflect what he is actually taking which is 5mg daily.  Otherwise, labs to be updated.  No changes are anticipated..  MIPS Vaccines Flu and Pneumonia updated in Shot record Colorectal Cancer Screening is up to date and the results are in the chart Advance  Directive/Surrogate Decision Maker discussed and pt declines to complete today           Prescriptions:       [Refilled] zolpidem 5 mg oral tablet [take 1 tablet (5 mg) by oral route once daily at bedtime], #90 (ninety) tablets, Refills: 1 (one)       [Refilled] hydroCHLOROthiazide 25 mg oral tablet [Take 1 tablet(s) by mouth daily], #90 (ninety) tablets, Refills: 3 (three)       [Refilled] rosuvastatin 20 mg oral tablet [take 1 tablet (20 mg) by oral route once daily], #90 (ninety) tablets, Refills: 3 (three)           Orders:       [id number]  Colorectal CA screen results documented and reviewed (PV)  (In-House)            1124F  Advance Care Planning discussed and doc in MR; no surrogate named or advance care plan provided  (Send-Out)              Mixed hyperlipidemia    LABORATORY:  Labs ordered to be performed today include Comprehensive metabolic panel.            Orders:       [id number]  COMP - [facility] Comp. Metabolic Panel  (Send-Out)              Essential (primary) hypertensionAs above.        Other long term (current) drug therapyAs above.    LABORATORY:  Labs ordered to be performed today include Drug screen.            Orders:       [id number]  Drug test prsmv qual dir optical obs per day  (In-House)              Elevated prostate specific antigen [PSA]          Orders:       [id number]  PSA - [facility] PSA DIAGNOSTIC  (Send-Out)              Unspecified atrial fibrillation    LABORATORY:  Labs ordered to be performed today include CBC W/O DIFF.            Orders:       [id number]  BDCB2 - [facility] CBC w/o diff  (Send-Out)                  Charge Capture:         Primary Diagnosis:     F51.01  Primary insomnia           Orders:      [id number]  Office/outpatient visit; established patient, level 4  (In-House)            3017F  Colorectal CA screen results documented and reviewed (PV)  (In-House)              E78.2  Mixed hyperlipidemia     I10  Essential (primary) hypertension     Z79.899  Other long term (current) drug therapy            Orders:      [id number]  Drug test prsmv qual dir optical obs per day  (In-House)              R97.20  Elevated prostate specific antigen [PSA]     I48.91  Unspecified atrial fibrillation

## 2021-05-18 NOTE — PROGRESS NOTES
Magnus Rousseau 1945     Office/Outpatient Visit    Visit Date: Wed, May 29, 2019 10:54 am    Provider: Harini Jackson N.P. (Assistant: Liss Us LPN)    Location: [facility]        Electronically signed by Harini Jackson N.P. on  06/02/2019 01:00:06 AM                             SUBJECTIVE:        CC:     Mr. Rousseau is a 74 year old White male.  He presents with low back pain.          HPI:         Patient complains of low back pain.  Other details: Pt states low back pain intermittent for years. States  being seen in the past but no testing. States using heating pad or hot shower and will help pain. States yesterday afternoon pain started. No inury or activity to cause pain. Last night he got in a hot shower which helped. This am he was hurting and had trouble getting out of bed. Could hardly raise up. Taking aspirin without much relief..          Additionally, he presents with history of shoulder pain.  pt states R shoulder pain x 3 weeks. States no injury.      ROS:     CONSTITUTIONAL:  Negative for chills, fatigue, fever and weight change.      CARDIOVASCULAR:  Negative for chest pain, orthopnea, paroxysmal nocturnal dyspnea and pedal edema.      RESPIRATORY:  Negative for dyspnea and cough.      GASTROINTESTINAL:  Negative for abdominal pain, heartburn, constipation, diarrhea, and stool changes.      PSYCHIATRIC:  Negative for anxiety and depression.          PMH/FMH/SH:     Last Reviewed on 5/29/2019 11:20 AM by Harini Jackson    Past Medical History:             PAST MEDICAL HISTORY         Hyperlipidemia    Hypertension     Erectile Dysfunction     Insomnia         CURRENT MEDICAL PROVIDERS:    Dr. Simon - hemorrhoid         PREVENTIVE HEALTH MAINTENANCE             COLORECTAL CANCER SCREENING: colonoscopy with normal results; November 4, 2009         Surgical History:         Appendectomy      Rectal Fistula;         Family History:         Positive for Myocardial  "Infarction ( father ).      Positive for Suicide - mother.          Social History:     Occupation:    Retired     Marital Status:      Children: 2 children         Tobacco/Alcohol/Supplements:     Last Reviewed on 5/29/2019 11:20 AM by Harini Jackson    Tobacco: He has never smoked.          Alcohol: Frequency:    \"I may drink...one beer a night\";         Substance Abuse History:     Last Reviewed on 5/29/2019 11:20 AM by Harini Jackson    NEGATIVE         Mental Health History:     Last Reviewed on 5/29/2019 11:20 AM by Harini Jackson        Communicable Diseases (eg STDs):     Last Reviewed on 5/29/2019 11:20 AM by Harini Jackson            Current Problems:     Last Reviewed on 5/29/2019 11:20 AM by Harini Jackson    Chronic insomnia     Low back pain     Essential hypertension     Use of high risk medications     Malaise and Fatigue     Dizziness     Hypertension     Hypercholesterolemia     Erectile dysfunction due to organic reasons     Shoulder pain     Elevated prostate specific antigen (PSA)     Screening for prostate cancer     Screening for rectal cancer         Immunizations:     Flulaval 10/13/2010     Fluzone (3 + years dose) 9/26/2011     Fluzone (3 + years dose) 9/21/2012     Fluzone High-Dose pf (>=65 yr) 10/11/2013     Fluzone High-Dose pf (>=65 yr) 10/23/2014     Fluzone High-Dose pf (>=65 yr) 10/30/2015     Fluzone High-Dose pf (>=65 yr) 10/20/2016     Fluzone High-Dose pf (>=65 yr) 11/6/2017     Fluzone High-Dose pf (>=65 yr) 10/1/2018     Pneumococcal, 23-valent IM/SC (adult and pt >=2yr) 3/5/2011         Allergies:     Last Reviewed on 5/29/2019 11:20 AM by Harini Jackson      No Known Drug Allergies.         Current Medications:     Last Reviewed on 5/29/2019 11:20 AM by Harini Jackson    Zolpidem Tartrate 10mg Tablet Take 1 tablet(s) by mouth at bedtime prn     Hydrochlorothiazide (HCTZ) 25mg Tablet Take 1 tablet(s) by mouth daily     Simvastatin 40mg Tablet " Take 1 tablet(s) by mouth at bedtime     Aspirin (ASA) 325mg Tablet 1 tab daily         OBJECTIVE:        Vitals:         Current: 5/29/2019 10:58:46 AM    Ht:  5 ft, 8.5 in;  Wt: 208.2 lbs;  BMI: 31.2    T: 97.4 F (oral);  BP: 149/72 mm Hg (right arm, sitting);  P: 62 bpm (right arm (BP Cuff), sitting);  sCr: 1.15 mg/dL;  GFR: 56.73        Exams:     PHYSICAL EXAM:     GENERAL: Vitals recorded well developed, well nourished;  well groomed;  no apparent distress;     EYES: lids and lacrimal system are normal in appearance; extraocular movements intact; conjunctiva and cornea are normal; PERRLA;     E/N/T:  normal EACs, TMs, nasal/oral mucosa, teeth, gingiva, and oropharynx;     NECK:  supple, full ROM; no thyromegaly; no carotid bruits;     RESPIRATORY: normal respiratory rate and pattern with no distress; normal breath sounds with no rales, rhonchi, wheezes or rubs;     CARDIOVASCULAR: normal rate; rhythm is regular;  normal S1; normal S2; no systolic murmur; no cyanosis; no edema;     GASTROINTESTINAL: nontender, nondistended; no hepatosplenomegaly or masses; no bruits;     SKIN:  no significant rashes or lesions; no suspicious moles;     MUSCULOSKELETAL:  Normal range of motion, strength and tone;     NEUROLOGICAL:  cranial nerves, motor and sensory function, reflexes, gait and coordination are all intact;     PSYCHIATRIC:  appropriate affect and demeanor; normal speech pattern; grossly normal memory;         ASSESSMENT:           724.2   M54.5  Low back pain              DDx:     733.90   M25.811  Scapulalgia              DDx:         ORDERS:         Meds Prescribed:       Robaxin (Methocarbamol) 500mg Tablet 1 tab HS PRN  #20 (Twenty) tablet(s) Refills: 1         Radiology/Test Orders:       [id number]  Radiologic examination, spine, lumbosacral;  minimum of four views  (Send-Out)           Procedures Ordered:       REFER  Referral to Specialist or Other Facility  (Send-Out)                   PLAN:          Low back  pain         RADIOLOGY:  I have ordered Lumbar/Sacral Spine X-ray to be done today.      REFERRALS:  Referral initiated to physical therapy ( [facility] ).            Prescriptions:       Robaxin (Methocarbamol) 500mg Tablet 1 tab HS PRN  #20 (Twenty) tablet(s) Refills: 1           Orders:       REFER  Referral to Specialist or Other Facility  (Send-Out)         83298  Radiologic examination, spine, lumbosacral;  minimum of four views  (Send-Out)            Scapulalgia     Rscapula Xray             CHARGE CAPTURE:           Primary Diagnosis:     724.2 Low back pain            M54.5    Low back pain              Orders:          [id number]   Office/outpatient visit; established patient, level 3  (In-House)           733.90 Scapulalgia            M25.811    Other specified joint disorders, right shoulder

## 2021-05-18 NOTE — PROGRESS NOTES
Magnus Rousseau  1945     Office/Outpatient Visit    Visit Date: Fri, Dec 13, 2019 11:09 am    Provider: Marco Verdin MD (Assistant: Megha Felton, )    Location: [facility]        Electronically signed by Marco Verdin MD on  12/14/2019 07:03:32 AM                             Subjective:        CC: blood pressure, cholesterol, sleep    HPI:       Mr. Rousseau presents with hypertension.  His current cardiac medication regimen includes a diuretic ( Hydrodiuril ).  He does check his blood pressure pretty frequently at home - about once per week.  Review of his blood pressure log reveals systolics in the 130s to 140s.  He is tolerating the medication well without side effects.  Compliance with treatment has been good; he takes his medication as directed and follows up as directed.          Concerning hypercholesterolemia, current treatment includes Zocor.  Compliance with treatment has been good; he takes his medication as directed and follows up as directed.  He denies experiencing any hypercholesterolemia related symptoms.          Magnus is also in today for follow up on insomnia.  He has been on Ambien for over 10 years.  He tolerates this well and feels it is necessarily for ongoing function and sleep.  He has tried to do without it previously, but he does not sleep well.    He currently takes 1/2 tablet at night.  He denies side effects.  He denies abuse, diversion, or doctor shopping.  Dutch is okay.    ROS:     CONSTITUTIONAL:  Negative for chills and fever.      CARDIOVASCULAR:  Negative for chest pain and palpitations.      RESPIRATORY:  Negative for recent cough and dyspnea.      GASTROINTESTINAL:  Negative for abdominal pain, nausea and vomiting.      INTEGUMENTARY:  Negative for atypical mole(s) and rash.          Past Medical History / Family History / Social History:         Last Reviewed on 12/13/2019 11:33 AM by Marco Verdin    Past Medical History:          "    PAST MEDICAL HISTORY         Hyperlipidemia    Hypertension     Erectile Dysfunction     Insomnia         CURRENT MEDICAL PROVIDERS:    Dr. Simon - hemorrhoid         PREVENTIVE HEALTH MAINTENANCE             COLORECTAL CANCER SCREENING: colonoscopy with normal results; November 4, 2009         Surgical History:         Appendectomy     Pacemaker Implantation: 06/2019;     Rectal Fistula;         Family History:         Positive for Myocardial Infarction ( father ).      Positive for Suicide - mother.          Social History:     Occupation:    Retired     Marital Status:      Children: 2 children         Tobacco/Alcohol/Supplements:     Last Reviewed on 12/13/2019 11:33 AM by Marco Verdin    Tobacco: He has never smoked.          Alcohol: Frequency:    \"I may drink...one beer a night\";         Substance Abuse History:     Last Reviewed on 12/13/2019 11:33 AM by Marco Verdin    NEGATIVE         Mental Health History:     Last Reviewed on 12/13/2019 11:33 AM by Marco Verdin        Communicable Diseases (eg STDs):     Last Reviewed on 12/13/2019 11:33 AM by Marco Verdin        Current Problems:     Last Reviewed on 12/13/2019 11:33 AM by Marco Verdin    Mixed hyperlipidemia    Essential (primary) hypertension    Other long term (current) drug therapy    Encounter for screening for malignant neoplasm of rectum    Encounter for screening for malignant neoplasm of prostate    Low back pain    Elevated prostate specific antigen [PSA]    Pain in right shoulder    Other specified joint disorders, right shoulder    Primary insomnia        Immunizations:     Flulaval 10/13/2010    Fluzone (3 + years dose) 9/26/2011    Fluzone (3 + years dose) 9/21/2012    Fluzone High-Dose pf (>=65 yr) 10/11/2013    Fluzone High-Dose pf (>=65 yr) 10/23/2014    Fluzone High-Dose pf (>=65 yr) 10/30/2015    Fluzone High-Dose pf (>=65 yr) 10/20/2016    Fluzone High-Dose pf (>=65 yr) " 11/6/2017    Fluzone High-Dose pf (>=65 yr) 10/1/2018    Fluzone High-Dose pf (>=65 yr) 10/30/2019    Pneumococcal, 23-valent IM/SC (adult and pt >=2yr) 3/5/2011        Allergies:     Last Reviewed on 12/13/2019 11:33 AM by Marco Verdin    No Known Allergies.        Current Medications:     Last Reviewed on 12/13/2019 11:33 AM by Marco Verdin    Aspirin (ASA) 325mg Tablet [1 tab daily]    Zolpidem Tartrate 10mg Tablet [Take 1 tablet(s) by mouth at bedtime prn]    Simvastatin 40mg Tablet [Take 1 tablet(s) by mouth at bedtime]    Hydrochlorothiazide (HCTZ) 25mg Tablet [Take 1 tablet(s) by mouth daily]        Objective:        Vitals:         Current: 12/13/2019 11:13:15 AM    Ht:  5 ft, 8.5 in;  Wt: 218.2 lbs;  BMI: 32.7T: 97.6 F (oral);  BP: 140/74 mm Hg (right arm, sitting);  P: 83 bpm (right arm (BP Cuff), sitting);  sCr: 1.03 mg/dL;  GFR: 64.62        Repeat:     11:46:51 AM  BP:   153/83mm Hg (right arm, sitting, HR:  73)     Exams:     PHYSICAL EXAM:     GENERAL: Vitals recorded well developed, well nourished;     EYES: extraocular movements intact; conjunctiva and cornea are normal; PERRL;     E/N/T: EARS:  normal external auditory canals and tympanic membranes;  grossly normal hearing; OROPHARYNX:  normal mucosa, dentition, gingiva, and posterior pharynx;     NECK: range of motion is normal; thyroid is non-palpable;     RESPIRATORY: normal respiratory rate and pattern with no distress; normal breath sounds with no rales, rhonchi, wheezes or rubs;     CARDIOVASCULAR: normal rate; rhythm is regular;  no systolic murmur;     GASTROINTESTINAL: nontender; normal bowel sounds; no masses;     LYMPHATIC: no enlargement of cervical or facial nodes; no supraclavicular nodes;     SKIN:  no significant rashes or lesions; no suspicious moles;     NEUROLOGIC: mental status: alert and oriented x 3; cranial nerves II-XII grossly intact;     PSYCHIATRIC: appropriate affect and demeanor; normal psychomotor  function;         Assessment:         E78.2   Mixed hyperlipidemia       I10   Essential (primary) hypertension       Z79.899   Other long term (current) drug therapy       F51.01   Primary insomnia       Z12.11   Encounter for screening for malignant neoplasm of colon           ORDERS:         Meds Prescribed:       [Refilled] zolpidem 10 mg oral tablet [Take 1 tablet(s) by mouth at bedtime prn], #90 (ninety) tablets, Refills: 0 (zero)       [Refilled] hydroCHLOROthiazide 25 mg oral tablet [Take 1 tablet(s) by mouth daily], #90 (ninety) tablets, Refills: 1 (one)       [Refilled] Simvastatin 40 mg oral tablet [Take 1 tablet(s) by mouth at bedtime], #90 (ninety) tablets, Refills: 1 (one)         Other Orders:       [id number]  Cologuard  (Send-Out)            1101F  Pt screen for fall risk; document no falls in past year or only 1 fall w/o injury in past year (NELDA)  (In-House)                      Plan:         Mixed hyperlipidemia        RECOMMENDATIONS given include: Today, we have reviewed Magnus's care.  I'm going to refill his medications including Ambien.  We did review his most recent labs.  No changes are planned.  Consider additional treatment for the blood pressure.  Manohar as noted..  ADAMS Has had no falls or only one fall without injury in the past year Vaccines Flu and Pneumonia updated in Shot record           Prescriptions:       [Refilled] Simvastatin 40 mg oral tablet [Take 1 tablet(s) by mouth at bedtime], #90 (ninety) tablets, Refills: 1 (one)           Orders:       1101F  Pt screen for fall risk; document no falls in past year or only 1 fall w/o injury in past year (NELDA)  (In-House)                Patient Education Handouts:       Hyperlipidemia (Hyperlipoproteinemia)          Essential (primary) hypertension          Prescriptions:       [Refilled] hydroCHLOROthiazide 25 mg oral tablet [Take 1 tablet(s) by mouth daily], #90 (ninety) tablets, Refills: 1 (one)         Other long term (current) drug  therapyAs above.        Primary insomnia          Prescriptions:       [Refilled] zolpidem 10 mg oral tablet [Take 1 tablet(s) by mouth at bedtime prn], #90 (ninety) tablets, Refills: 0 (zero)         Encounter for screening for malignant neoplasm of colon          Orders:       [id number]  Cologuard  (Send-Out)                  Charge Capture:         Primary Diagnosis:     E78.2  Mixed hyperlipidemia           Orders:      34994  Office/outpatient visit; established patient, level 4  (In-House)            1101F  Pt screen for fall risk; document no falls in past year or only 1 fall w/o injury in past year (NELDA)  (In-House)              I10  Essential (primary) hypertension     Z79.899  Other long term (current) drug therapy     F51.01  Primary insomnia     Z12.11  Encounter for screening for malignant neoplasm of colon

## 2021-07-01 VITALS
TEMPERATURE: 97.4 F | HEART RATE: 62 BPM | DIASTOLIC BLOOD PRESSURE: 72 MMHG | BODY MASS INDEX: 30.84 KG/M2 | HEIGHT: 69 IN | SYSTOLIC BLOOD PRESSURE: 149 MMHG | WEIGHT: 208.2 LBS

## 2021-07-01 VITALS
SYSTOLIC BLOOD PRESSURE: 140 MMHG | DIASTOLIC BLOOD PRESSURE: 79 MMHG | HEART RATE: 86 BPM | BODY MASS INDEX: 30.57 KG/M2 | HEIGHT: 69 IN | WEIGHT: 206.4 LBS | TEMPERATURE: 97.7 F

## 2021-07-01 VITALS
WEIGHT: 207 LBS | HEART RATE: 62 BPM | HEIGHT: 69 IN | BODY MASS INDEX: 30.66 KG/M2 | TEMPERATURE: 97.7 F | SYSTOLIC BLOOD PRESSURE: 126 MMHG | DIASTOLIC BLOOD PRESSURE: 74 MMHG

## 2021-07-01 VITALS
HEIGHT: 69 IN | BODY MASS INDEX: 32.32 KG/M2 | WEIGHT: 218.2 LBS | DIASTOLIC BLOOD PRESSURE: 83 MMHG | HEART RATE: 83 BPM | SYSTOLIC BLOOD PRESSURE: 153 MMHG | TEMPERATURE: 97.6 F

## 2021-07-01 VITALS
BODY MASS INDEX: 30.9 KG/M2 | WEIGHT: 208.6 LBS | SYSTOLIC BLOOD PRESSURE: 131 MMHG | TEMPERATURE: 97.9 F | HEART RATE: 54 BPM | DIASTOLIC BLOOD PRESSURE: 65 MMHG | HEIGHT: 69 IN

## 2021-07-02 VITALS
DIASTOLIC BLOOD PRESSURE: 83 MMHG | HEART RATE: 79 BPM | HEIGHT: 69 IN | BODY MASS INDEX: 30.96 KG/M2 | TEMPERATURE: 97.9 F | WEIGHT: 209 LBS | SYSTOLIC BLOOD PRESSURE: 145 MMHG

## 2021-07-02 VITALS
BODY MASS INDEX: 31.64 KG/M2 | HEART RATE: 74 BPM | TEMPERATURE: 97.1 F | SYSTOLIC BLOOD PRESSURE: 140 MMHG | WEIGHT: 213.6 LBS | HEIGHT: 69 IN | DIASTOLIC BLOOD PRESSURE: 77 MMHG

## 2021-07-02 VITALS
BODY MASS INDEX: 32.05 KG/M2 | TEMPERATURE: 97.3 F | HEIGHT: 69 IN | DIASTOLIC BLOOD PRESSURE: 84 MMHG | HEART RATE: 77 BPM | WEIGHT: 216.4 LBS | SYSTOLIC BLOOD PRESSURE: 136 MMHG

## 2021-08-12 ENCOUNTER — TELEPHONE (OUTPATIENT)
Dept: FAMILY MEDICINE CLINIC | Age: 76
End: 2021-08-12

## 2021-08-12 DIAGNOSIS — F51.01 PRIMARY INSOMNIA: Primary | ICD-10-CM

## 2021-08-12 RX ORDER — ZOLPIDEM TARTRATE 5 MG/1
5 TABLET ORAL DAILY
COMMUNITY
Start: 2021-05-13 | End: 2021-08-12 | Stop reason: SDUPTHER

## 2021-08-12 RX ORDER — ZOLPIDEM TARTRATE 5 MG/1
5 TABLET ORAL DAILY
Qty: 90 TABLET | Refills: 0 | Status: SHIPPED | OUTPATIENT
Start: 2021-08-12 | End: 2021-09-16 | Stop reason: SDUPTHER

## 2021-08-12 NOTE — TELEPHONE ENCOUNTER
Caller: Magnus Rosuseau    Relationship: Self    Best call back number: [phone]     Medication needed: ZOLPIDEM     When do you need the refill by: 08/12/21     What additional details did the patient provide when requesting the medication: PATIENT IS NEEDING A REFILL. PLEASE CALL AND ADVISE.     Does the patient have less than a 3 day supply:  [x] Yes  [] No    What is the patient's preferred pharmacy: [facility] [address] [place] [address], [place] [address]  [address]  - [phone]  - [phone] FX

## 2021-08-17 ENCOUNTER — TELEPHONE (OUTPATIENT)
Dept: FAMILY MEDICINE CLINIC | Age: 76
End: 2021-08-17

## 2021-08-17 NOTE — TELEPHONE ENCOUNTER
Caller: DARRELL    Relationship: Other [facility] [employer]    Best call back number: [phone]    What is the best time to reach you: ANYTIME    Who are you requesting to speak with (clinical staff, provider,  specific staff member): CLINICAL    Do you know the name of the person who called: DARRELL    What was the call regarding: NEEDING FURTHER INFORMATION FOR A PRE AUTHORIZATION    Do you require a callback: YES

## 2021-09-16 ENCOUNTER — OFFICE VISIT (OUTPATIENT)
Dept: FAMILY MEDICINE CLINIC | Age: 76
End: 2021-09-16

## 2021-09-16 VITALS
HEART RATE: 63 BPM | SYSTOLIC BLOOD PRESSURE: 123 MMHG | BODY MASS INDEX: 31.52 KG/M2 | HEIGHT: 69 IN | TEMPERATURE: 97.5 F | WEIGHT: 212.8 LBS | DIASTOLIC BLOOD PRESSURE: 73 MMHG

## 2021-09-16 DIAGNOSIS — Z11.59 NEED FOR HEPATITIS C SCREENING TEST: ICD-10-CM

## 2021-09-16 DIAGNOSIS — Z00.00 PHYSICAL EXAM: Primary | ICD-10-CM

## 2021-09-16 DIAGNOSIS — F51.01 PRIMARY INSOMNIA: ICD-10-CM

## 2021-09-16 DIAGNOSIS — I48.91 ATRIAL FIBRILLATION, UNSPECIFIED TYPE (HCC): ICD-10-CM

## 2021-09-16 DIAGNOSIS — I10 ESSENTIAL HYPERTENSION: ICD-10-CM

## 2021-09-16 DIAGNOSIS — R97.20 ELEVATED PSA: ICD-10-CM

## 2021-09-16 DIAGNOSIS — E78.2 MIXED HYPERLIPIDEMIA: ICD-10-CM

## 2021-09-16 DIAGNOSIS — Z79.899 OTHER LONG TERM (CURRENT) DRUG THERAPY: ICD-10-CM

## 2021-09-16 PROCEDURE — G0439 PPPS, SUBSEQ VISIT: HCPCS | Performed by: FAMILY MEDICINE

## 2021-09-16 PROCEDURE — 99214 OFFICE O/P EST MOD 30 MIN: CPT | Performed by: FAMILY MEDICINE

## 2021-09-16 RX ORDER — DILTIAZEM HYDROCHLORIDE 300 MG/1
CAPSULE, EXTENDED RELEASE ORAL
COMMUNITY
End: 2022-06-21

## 2021-09-16 RX ORDER — HYDROCHLOROTHIAZIDE 25 MG/1
1 TABLET ORAL DAILY
COMMUNITY
End: 2021-09-16 | Stop reason: SDUPTHER

## 2021-09-16 RX ORDER — WARFARIN SODIUM 5 MG/1
1 TABLET ORAL DAILY
COMMUNITY
Start: 2021-09-03

## 2021-09-16 RX ORDER — ROSUVASTATIN CALCIUM 20 MG/1
20 TABLET, COATED ORAL DAILY
Qty: 90 TABLET | Refills: 3 | Status: SHIPPED | OUTPATIENT
Start: 2021-09-16 | End: 2022-04-28 | Stop reason: SDUPTHER

## 2021-09-16 RX ORDER — ROSUVASTATIN CALCIUM 20 MG/1
1 TABLET, COATED ORAL DAILY
COMMUNITY
Start: 2021-08-05 | End: 2021-09-16 | Stop reason: SDUPTHER

## 2021-09-16 RX ORDER — AMLODIPINE BESYLATE 2.5 MG/1
2.5 TABLET ORAL DAILY
COMMUNITY
Start: 2021-08-05

## 2021-09-16 RX ORDER — ZOLPIDEM TARTRATE 5 MG/1
5 TABLET ORAL NIGHTLY
Qty: 90 TABLET | Refills: 0 | Status: SHIPPED | OUTPATIENT
Start: 2021-09-16 | End: 2022-01-26

## 2021-09-16 RX ORDER — WARFARIN SODIUM 2.5 MG/1
1 TABLET ORAL DAILY
COMMUNITY
Start: 2021-09-08

## 2021-09-16 RX ORDER — HYDROCHLOROTHIAZIDE 25 MG/1
25 TABLET ORAL DAILY
Qty: 90 TABLET | Refills: 3 | Status: SHIPPED | OUTPATIENT
Start: 2021-09-16 | End: 2022-05-19 | Stop reason: SDUPTHER

## 2021-09-16 NOTE — PATIENT INSTRUCTIONS
Health Maintenance After Age 65  After age 65, you are at a higher risk for certain long-term diseases and infections as well as injuries from falls. Falls are a major cause of broken bones and head injuries in people who are older than age 65. Getting regular preventive care can help to keep you healthy and well. Preventive care includes getting regular testing and making lifestyle changes as recommended by your health care provider. Talk with your health care provider about:  · Which screenings and tests you should have. A screening is a test that checks for a disease when you have no symptoms.  · A diet and exercise plan that is right for you.  What should I know about screenings and tests to prevent falls?  Screening and testing are the best ways to find a health problem early. Early diagnosis and treatment give you the best chance of managing medical conditions that are common after age 65. Certain conditions and lifestyle choices may make you more likely to have a fall. Your health care provider may recommend:  · Regular vision checks. Poor vision and conditions such as cataracts can make you more likely to have a fall. If you wear glasses, make sure to get your prescription updated if your vision changes.  · Medicine review. Work with your health care provider to regularly review all of the medicines you are taking, including over-the-counter medicines. Ask your health care provider about any side effects that may make you more likely to have a fall. Tell your health care provider if any medicines that you take make you feel dizzy or sleepy.  · Osteoporosis screening. Osteoporosis is a condition that causes the bones to get weaker. This can make the bones weak and cause them to break more easily.  · Blood pressure screening. Blood pressure changes and medicines to control blood pressure can make you feel dizzy.  · Strength and balance checks. Your health care provider may recommend certain tests to check your  strength and balance while standing, walking, or changing positions.  · Foot health exam. Foot pain and numbness, as well as not wearing proper footwear, can make you more likely to have a fall.  · Depression screening. You may be more likely to have a fall if you have a fear of falling, feel emotionally low, or feel unable to do activities that you used to do.  · Alcohol use screening. Using too much alcohol can affect your balance and may make you more likely to have a fall.  What actions can I take to lower my risk of falls?  General instructions  · Talk with your health care provider about your risks for falling. Tell your health care provider if:  ? You fall. Be sure to tell your health care provider about all falls, even ones that seem minor.  ? You feel dizzy, sleepy, or off-balance.  · Take over-the-counter and prescription medicines only as told by your health care provider. These include any supplements.  · Eat a healthy diet and maintain a healthy weight. A healthy diet includes low-fat dairy products, low-fat (lean) meats, and fiber from whole grains, beans, and lots of fruits and vegetables.  Home safety  · Remove any tripping hazards, such as rugs, cords, and clutter.  · Install safety equipment such as grab bars in bathrooms and safety rails on stairs.  · Keep rooms and walkways well-lit.  Activity    · Follow a regular exercise program to stay fit. This will help you maintain your balance. Ask your health care provider what types of exercise are appropriate for you.  · If you need a cane or walker, use it as recommended by your health care provider.  · Wear supportive shoes that have nonskid soles.    Lifestyle  · Do not drink alcohol if your health care provider tells you not to drink.  · If you drink alcohol, limit how much you have:  ? 0-1 drink a day for women.  ? 0-2 drinks a day for men.  · Be aware of how much alcohol is in your drink. In the [place] one drink equals one typical bottle of beer  (12 oz), one-half glass of wine (5 oz), or one shot of hard liquor (1½ oz).  · Do not use any products that contain nicotine or tobacco, such as cigarettes and e-cigarettes. If you need help quitting, ask your health care provider.  Summary  · Having a healthy lifestyle and getting preventive care can help to protect your health and wellness after age 65.  · Screening and testing are the best way to find a health problem early and help you avoid having a fall. Early diagnosis and treatment give you the best chance for managing medical conditions that are more common for people who are older than age 65.  · Falls are a major cause of broken bones and head injuries in people who are older than age 65. Take precautions to prevent a fall at home.  · Work with your health care provider to learn what changes you can make to improve your health and wellness and to prevent falls.  This information is not intended to replace advice given to you by your health care provider. Make sure you discuss any questions you have with your health care provider.  Document Revised: 04/09/2020 Document Reviewed: 10/31/2018  Elsevier Patient Education © 2021 [employer]

## 2021-09-16 NOTE — PROGRESS NOTES
The ABCs of the Annual Wellness Visit  Subsequent Medicare Wellness Visit    Chief Complaint:  Medicare wellness exam, blood pressure, cholesterol, insomnia    Subjective    History of Present Illness:  Magnus Rousseau is a 76 y.o. male who presents for a Subsequent Medicare Wellness Visit.    The following portions of the patient's history were reviewed and   updated as appropriate: allergies, current medications, past family history, past medical history, past social history, past surgical history and problem list.     Compared to one year ago, the patient feels his physical health is the same.    Compared to one year ago, the patient feels his mental health is the same.    Recent Hospitalizations:  He was not admitted to the hospital during the last year.       Current Medical Providers:  Patient Care Team:  Marco Verdin MD as PCP - General (Family Medicine)    Outpatient Medications Prior to Visit   Medication Sig Dispense Refill   • amLODIPine (NORVASC) 2.5 MG tablet Take 2.5 mg by mouth Daily.     • warfarin (COUMADIN) 2.5 MG tablet Take 1 tablet by mouth Daily.     • warfarin (COUMADIN) 5 MG tablet Take 1 tablet by mouth Daily.     • hydroCHLOROthiazide (HYDRODIURIL) 25 MG tablet Take 1 tablet by mouth Daily.     • rosuvastatin (CRESTOR) 20 MG tablet Take 1 tablet by mouth Daily.     • zolpidem (AMBIEN) 5 MG tablet Take 1 tablet by mouth Daily. 90 tablet 0   • dilTIAZem (TIAZAC) 300 MG 24 hr capsule diltiazem HCl 300 mg oral capsule, extended release take 1 capsule (300 mg) by oral route once daily   Active       No facility-administered medications prior to visit.       No opioid medication identified on active medication list. I have reviewed chart for other potential  high risk medication/s and harmful drug interactions in the elderly.          Aspirin is not on active medication list.  Aspirin use is not indicated based on review of current medical condition/s. Risk of harm outweighs potential  "benefits.  .    Patient Active Problem List   Diagnosis   • Primary insomnia   • Essential hypertension   • Mixed hyperlipidemia   • Atrial fibrillation (CMS/HCC)   • Other long term (current) drug therapy     Advance Care Planning   Advance Directive is on file.  ACP discussion was held with the patient during this visit. Patient has an advance directive in EMR which is still valid.     Magnus is also in today for follow up on sleep issues.  He has been on Ambien for some time and says that he is not able to sleep well without it.  He denies acute issue related to this or side effects. Dutch reviewed.    In addition to the above, Magnus also has a history of hypertension for which he remains on multiple medications.  He does have atrial fibrillation as well.  His blood pressure is well controlled today.  He denies any obvious new side effects or concerns related to the medications.    He also has elevated cholesterol and continues to take rosuvastatin 20 mg daily.      Review of Systems:  Review of Systems   Constitutional: Negative for chills and fever.   Respiratory: Negative for cough and shortness of breath.    Cardiovascular: Negative for chest pain and palpitations.   Gastrointestinal: Negative for abdominal pain, nausea and vomiting.         Objective       Vitals:    09/16/21 1603   BP: 123/73   BP Location: Right arm   Patient Position: Sitting   Pulse: 63   Temp: 97.5 °F (36.4 °C)   TempSrc: Oral   Weight: 96.5 kg (212 lb 12.8 oz)   Height: 174 cm (68.5\")   PainSc: 0-No pain     BMI Readings from Last 1 Encounters:   09/16/21 31.88 kg/m²   BMI is above normal parameters. Recommendations include: exercise counseling and nutrition counseling    Does the patient have evidence of cognitive impairment? No    Physical Exam  Vitals and nursing note reviewed.   Constitutional:       General: He is not in acute distress.     Appearance: He is obese. He is not ill-appearing.   HENT:      Right Ear: Tympanic membrane " and ear canal normal.      Left Ear: Tympanic membrane and ear canal normal.      Ears:      Comments: Hearing is normal bilaterally with forced whisper.     Mouth/Throat:      Mouth: Mucous membranes are moist.      Comments: Pharynx appears normal  Eyes:      Extraocular Movements: Extraocular movements intact.      Pupils: Pupils are equal, round, and reactive to light.      Comments: Binocular vision is 20/25 with correction.   Neck:      Thyroid: No thyromegaly.   Cardiovascular:      Rate and Rhythm: Normal rate. Rhythm irregular.      Heart sounds: No murmur heard.     Pulmonary:      Effort: Pulmonary effort is normal.      Breath sounds: Normal breath sounds.   Abdominal:      General: There is no distension.      Palpations: Abdomen is soft. There is no mass.      Tenderness: There is no abdominal tenderness.   Musculoskeletal:      Cervical back: Normal range of motion.   Skin:     Findings: No lesion or rash.   Neurological:      General: No focal deficit present.      Mental Status: He is oriented to person, place, and time.      Cranial Nerves: No cranial nerve deficit.   Psychiatric:         Mood and Affect: Mood normal.       The following data was reviewed by Marco Verdin MD on 09/16/2021.  Lab Results   Component Value Date    WBC 5.60 05/13/2021    HGB 16.10 05/13/2021    HCT 46.7 05/13/2021    MCV 95.1 05/13/2021    .00 05/13/2021     Lab Results   Component Value Date    BUN 13 05/13/2021    CREATININE 1.13 05/13/2021    BCR 12 05/13/2021    K 3.8 05/13/2021    CO2 24 05/13/2021    CALCIUM 9.3 05/13/2021    ALBUMIN 4.2 05/13/2021    LABIL2 1.5 05/13/2021    AST 36 05/13/2021    ALT 38 05/13/2021     Lab Results   Component Value Date    CHLPL 130 11/23/2020    TRIG 123 11/23/2020    HDL 41 11/23/2020    LDL 64 (L) 11/23/2020     No results found for: TSH  Lab Results   Component Value Date    HGBA1C 6.1 (H) 11/23/2020     Lab Results   Component Value Date    PSA 5.93 (H)  05/13/2021    PSA 5.25 (H) 11/23/2020    PSA 4.92 (H) 06/11/2020          HEALTH RISK ASSESSMENT    Smoking Status:  Social History     Tobacco Use   Smoking Status Never Smoker   Smokeless Tobacco Never Used     Alcohol Consumption:  Social History     Substance and Sexual Activity   Alcohol Use Yes   • Alcohol/week: 1.0 standard drinks   • Types: 1 Cans of beer per week    Comment: maybe 1 beer a night      Fall Risk Screen:    RONNY Fall Risk Assessment was completed, and patient is at LOW risk for falls.Assessment completed on:9/16/2021    Depression Screening:  PHQ-2/PHQ-9 Depression Screening 9/16/2021   Little interest or pleasure in doing things 0   Feeling down, depressed, or hopeless 0   Total Score 0       Health Habits and Functional and Cognitive Screening:  Functional & Cognitive Status 9/16/2021   Do you have difficulty preparing food and eating? No   Do you have difficulty bathing yourself, getting dressed or grooming yourself? No   Do you have difficulty using the toilet? No   Do you have difficulty moving around from place to place? No   Do you have trouble with steps or getting out of a bed or a chair? No   Current Diet Well Balanced Diet   Dental Exam Up to date   Eye Exam Up to date   Exercise (times per week) 0 times per week   Current Exercises Include No Regular Exercise   Do you need help using the phone?  No   Are you deaf or do you have serious difficulty hearing?  No   Do you need help with transportation? No   Do you need help shopping? No   Do you need help preparing meals?  No   Do you need help with housework?  No   Do you need help with laundry? No   Do you need help taking your medications? No   Do you need help managing money? No   Do you ever drive or ride in a car without wearing a seat belt? No   Have you felt unusual stress, anger or loneliness in the last month? No   Who do you live with? Spouse   If you need help, do you have trouble finding someone available to you? No    Have you been bothered in the last four weeks by sexual problems? No   Do you have difficulty concentrating, remembering or making decisions? No       Age-appropriate Screening Schedule:  Refer to the list below for future screening recommendations based on patient's age, sex and/or medical conditions. Orders for these recommended tests are listed in the plan section. The patient has been provided with a written plan.    Health Maintenance   Topic Date Due   • TDAP/TD VACCINES (1 - Tdap) Never done   • ZOSTER VACCINE (1 of 2) Never done   • INFLUENZA VACCINE  10/01/2021   • LIPID PANEL  11/23/2021                     Assessment and Plan:       CMS Preventative Services Quick Reference  Risk Factors Identified During Encounter  Cardiovascular Disease  Dementia/Memory   Immunizations Discussed/Encouraged (specific Immunizations; Tdap, Influenza, Shingrix and COVID19  Obesity/Overweight      The above risks/problems have been discussed with the patient.  Follow up actions/plans if indicated are seen below in the Assessment/Plan Section.  Pertinent information has been shared with the patient in the After Visit Summary.    Today, we have reviewed Magnus's care as above.  We will go ahead and move forward with some additional orders as noted below.  These labs will be due in mid November, and we will call him at that time.  His use of Ambien does seem reasonable to me.  We will go ahead and refill it for 6 months.  His other history and medicines are reviewed.  We will refill needed medications as well.  No near-term changes anticipated.  Again, he seems well.    Diagnoses and all orders for this visit:    1. Physical exam (Primary)    2. Primary insomnia  -     zolpidem (AMBIEN) 5 MG tablet; Take 1 tablet by mouth Every Night.  Dispense: 90 tablet; Refill: 0    3. Essential hypertension  -     hydroCHLOROthiazide (HYDRODIURIL) 25 MG tablet; Take 1 tablet by mouth Daily.  Dispense: 90 tablet; Refill: 3    4. Mixed  hyperlipidemia  -     Lipid Panel; Future  -     TSH; Future  -     rosuvastatin (CRESTOR) 20 MG tablet; Take 1 tablet by mouth Daily.  Dispense: 90 tablet; Refill: 3    5. Atrial fibrillation, unspecified type (CMS/HCC)  -     CBC (No Diff); Future  -     Comprehensive Metabolic Panel; Future  -     TSH; Future    6. Other long term (current) drug therapy  -     zolpidem (AMBIEN) 5 MG tablet; Take 1 tablet by mouth Every Night.  Dispense: 90 tablet; Refill: 0    7. Elevated PSA  -     PSA DIAGNOSTIC; Future    8. Need for hepatitis C screening test  -     Hepatitis C Antibody; Future        Follow Up:   Return in about 6 months (around 3/16/2022).     An After Visit Summary and PPPS were given to the patient.

## 2021-10-26 ENCOUNTER — LAB (OUTPATIENT)
Dept: LAB | Facility: HOSPITAL | Age: 76
End: 2021-10-26

## 2021-10-26 DIAGNOSIS — E78.2 MIXED HYPERLIPIDEMIA: ICD-10-CM

## 2021-10-26 DIAGNOSIS — I48.91 ATRIAL FIBRILLATION, UNSPECIFIED TYPE (HCC): ICD-10-CM

## 2021-10-26 DIAGNOSIS — Z11.59 NEED FOR HEPATITIS C SCREENING TEST: ICD-10-CM

## 2021-10-26 DIAGNOSIS — R97.20 ELEVATED PSA: ICD-10-CM

## 2021-10-26 LAB
ALBUMIN SERPL-MCNC: 4.2 G/DL (ref 3.5–5.2)
ALBUMIN/GLOB SERPL: 1.7 G/DL
ALP SERPL-CCNC: 61 U/L (ref 39–117)
ALT SERPL W P-5'-P-CCNC: 31 U/L (ref 1–41)
ANION GAP SERPL CALCULATED.3IONS-SCNC: 6.8 MMOL/L (ref 5–15)
AST SERPL-CCNC: 33 U/L (ref 1–40)
BILIRUB SERPL-MCNC: 0.6 MG/DL (ref 0–1.2)
BUN SERPL-MCNC: 9 MG/DL (ref 8–23)
BUN/CREAT SERPL: 8.3 (ref 7–25)
CALCIUM SPEC-SCNC: 9.4 MG/DL (ref 8.6–10.5)
CHLORIDE SERPL-SCNC: 104 MMOL/L (ref 98–107)
CHOLEST SERPL-MCNC: 117 MG/DL (ref 0–200)
CO2 SERPL-SCNC: 29.2 MMOL/L (ref 22–29)
CREAT SERPL-MCNC: 1.08 MG/DL (ref 0.76–1.27)
DEPRECATED RDW RBC AUTO: 43.6 FL (ref 37–54)
ERYTHROCYTE [DISTWIDTH] IN BLOOD BY AUTOMATED COUNT: 12.4 % (ref 12.3–15.4)
GFR SERPL CREATININE-BSD FRML MDRD: 66 ML/MIN/1.73
GLOBULIN UR ELPH-MCNC: 2.5 GM/DL
GLUCOSE SERPL-MCNC: 112 MG/DL (ref 65–99)
HCT VFR BLD AUTO: 46.9 % (ref 37.5–51)
HCV AB SER DONR QL: NORMAL
HDLC SERPL-MCNC: 35 MG/DL (ref 40–60)
HGB BLD-MCNC: 16.2 G/DL (ref 13–17.7)
LDLC SERPL CALC-MCNC: 63 MG/DL (ref 0–100)
LDLC/HDLC SERPL: 1.78 {RATIO}
MCH RBC QN AUTO: 32.4 PG (ref 26.6–33)
MCHC RBC AUTO-ENTMCNC: 34.5 G/DL (ref 31.5–35.7)
MCV RBC AUTO: 93.8 FL (ref 79–97)
PLATELET # BLD AUTO: 174 10*3/MM3 (ref 140–450)
PMV BLD AUTO: 10.6 FL (ref 6–12)
POTASSIUM SERPL-SCNC: 4 MMOL/L (ref 3.5–5.2)
PROT SERPL-MCNC: 6.7 G/DL (ref 6–8.5)
PSA SERPL-MCNC: 5.62 NG/ML (ref 0–4)
RBC # BLD AUTO: 5 10*6/MM3 (ref 4.14–5.8)
SODIUM SERPL-SCNC: 140 MMOL/L (ref 136–145)
TRIGL SERPL-MCNC: 98 MG/DL (ref 0–150)
TSH SERPL DL<=0.05 MIU/L-ACNC: 2.1 UIU/ML (ref 0.27–4.2)
VLDLC SERPL-MCNC: 19 MG/DL (ref 5–40)
WBC # BLD AUTO: 5.04 10*3/MM3 (ref 3.4–10.8)

## 2021-10-26 PROCEDURE — 84153 ASSAY OF PSA TOTAL: CPT

## 2021-10-26 PROCEDURE — 83036 HEMOGLOBIN GLYCOSYLATED A1C: CPT | Performed by: FAMILY MEDICINE

## 2021-10-26 PROCEDURE — 36415 COLL VENOUS BLD VENIPUNCTURE: CPT

## 2021-10-26 PROCEDURE — 80053 COMPREHEN METABOLIC PANEL: CPT

## 2021-10-26 PROCEDURE — 84443 ASSAY THYROID STIM HORMONE: CPT

## 2021-10-26 PROCEDURE — 80061 LIPID PANEL: CPT

## 2021-10-26 PROCEDURE — 86803 HEPATITIS C AB TEST: CPT

## 2021-10-26 PROCEDURE — 85027 COMPLETE CBC AUTOMATED: CPT

## 2021-10-27 DIAGNOSIS — R73.9 HYPERGLYCEMIA: Primary | ICD-10-CM

## 2021-10-27 LAB — HBA1C MFR BLD: 5.6 % (ref 4.8–5.6)

## 2022-01-26 DIAGNOSIS — Z79.899 OTHER LONG TERM (CURRENT) DRUG THERAPY: ICD-10-CM

## 2022-01-26 DIAGNOSIS — F51.01 PRIMARY INSOMNIA: ICD-10-CM

## 2022-01-27 RX ORDER — ZOLPIDEM TARTRATE 5 MG/1
5 TABLET ORAL NIGHTLY
Qty: 90 TABLET | Refills: 0 | Status: SHIPPED | OUTPATIENT
Start: 2022-01-27 | End: 2022-04-28 | Stop reason: SDUPTHER

## 2022-01-27 NOTE — TELEPHONE ENCOUNTER
I did send a 90-day refill on this.  I would recommend scheduling follow-up visit in about 90 days.  I will not be able to refill it beyond that without seeing him.  Thanks.

## 2022-04-28 DIAGNOSIS — E78.2 MIXED HYPERLIPIDEMIA: ICD-10-CM

## 2022-04-28 DIAGNOSIS — Z79.899 OTHER LONG TERM (CURRENT) DRUG THERAPY: ICD-10-CM

## 2022-04-28 DIAGNOSIS — F51.01 PRIMARY INSOMNIA: ICD-10-CM

## 2022-04-28 RX ORDER — ROSUVASTATIN CALCIUM 20 MG/1
20 TABLET, COATED ORAL DAILY
Qty: 90 TABLET | Refills: 0 | Status: SHIPPED | OUTPATIENT
Start: 2022-04-28 | End: 2022-05-19 | Stop reason: SDUPTHER

## 2022-04-28 RX ORDER — ZOLPIDEM TARTRATE 5 MG/1
5 TABLET ORAL NIGHTLY
Qty: 90 TABLET | Refills: 0 | Status: SHIPPED | OUTPATIENT
Start: 2022-04-28 | End: 2022-05-19 | Stop reason: SDUPTHER

## 2022-04-28 NOTE — TELEPHONE ENCOUNTER
PDMP has been reviewed.  Patient has been compliant with care and follow-up.  We will send a single refill on these medications as noted.  Follow-up as already been scheduled.  Thanks.

## 2022-05-19 ENCOUNTER — LAB (OUTPATIENT)
Dept: LAB | Facility: HOSPITAL | Age: 77
End: 2022-05-19

## 2022-05-19 ENCOUNTER — OFFICE VISIT (OUTPATIENT)
Dept: FAMILY MEDICINE CLINIC | Age: 77
End: 2022-05-19

## 2022-05-19 VITALS
BODY MASS INDEX: 32.14 KG/M2 | HEIGHT: 69 IN | WEIGHT: 217 LBS | SYSTOLIC BLOOD PRESSURE: 135 MMHG | HEART RATE: 72 BPM | TEMPERATURE: 97.7 F | DIASTOLIC BLOOD PRESSURE: 83 MMHG

## 2022-05-19 DIAGNOSIS — R97.20 ELEVATED PSA: ICD-10-CM

## 2022-05-19 DIAGNOSIS — I48.91 ATRIAL FIBRILLATION, UNSPECIFIED TYPE: ICD-10-CM

## 2022-05-19 DIAGNOSIS — Z79.899 OTHER LONG TERM (CURRENT) DRUG THERAPY: ICD-10-CM

## 2022-05-19 DIAGNOSIS — E78.2 MIXED HYPERLIPIDEMIA: ICD-10-CM

## 2022-05-19 DIAGNOSIS — F51.01 PRIMARY INSOMNIA: Primary | ICD-10-CM

## 2022-05-19 DIAGNOSIS — I10 ESSENTIAL HYPERTENSION: ICD-10-CM

## 2022-05-19 LAB
ALBUMIN SERPL-MCNC: 4.2 G/DL (ref 3.5–5.2)
ALBUMIN/GLOB SERPL: 1.8 G/DL
ALP SERPL-CCNC: 63 U/L (ref 39–117)
ALT SERPL W P-5'-P-CCNC: 36 U/L (ref 1–41)
ANION GAP SERPL CALCULATED.3IONS-SCNC: 10 MMOL/L (ref 5–15)
AST SERPL-CCNC: 35 U/L (ref 1–40)
BILIRUB SERPL-MCNC: 0.6 MG/DL (ref 0–1.2)
BUN SERPL-MCNC: 15 MG/DL (ref 8–23)
BUN/CREAT SERPL: 15.2 (ref 7–25)
CALCIUM SPEC-SCNC: 9.5 MG/DL (ref 8.6–10.5)
CHLORIDE SERPL-SCNC: 103 MMOL/L (ref 98–107)
CO2 SERPL-SCNC: 27 MMOL/L (ref 22–29)
CREAT SERPL-MCNC: 0.99 MG/DL (ref 0.76–1.27)
DEPRECATED RDW RBC AUTO: 44.5 FL (ref 37–54)
EGFRCR SERPLBLD CKD-EPI 2021: 78 ML/MIN/1.73
ERYTHROCYTE [DISTWIDTH] IN BLOOD BY AUTOMATED COUNT: 12.5 % (ref 12.3–15.4)
GLOBULIN UR ELPH-MCNC: 2.3 GM/DL
GLUCOSE SERPL-MCNC: 107 MG/DL (ref 65–99)
HCT VFR BLD AUTO: 47.9 % (ref 37.5–51)
HGB BLD-MCNC: 16.1 G/DL (ref 13–17.7)
MCH RBC QN AUTO: 31.9 PG (ref 26.6–33)
MCHC RBC AUTO-ENTMCNC: 33.6 G/DL (ref 31.5–35.7)
MCV RBC AUTO: 95 FL (ref 79–97)
PLATELET # BLD AUTO: 172 10*3/MM3 (ref 140–450)
PMV BLD AUTO: 10 FL (ref 6–12)
POTASSIUM SERPL-SCNC: 3.8 MMOL/L (ref 3.5–5.2)
PROT SERPL-MCNC: 6.5 G/DL (ref 6–8.5)
PSA SERPL-MCNC: 5.79 NG/ML (ref 0–4)
RBC # BLD AUTO: 5.04 10*6/MM3 (ref 4.14–5.8)
SODIUM SERPL-SCNC: 140 MMOL/L (ref 136–145)
TSH SERPL DL<=0.05 MIU/L-ACNC: 2.15 UIU/ML (ref 0.27–4.2)
WBC NRBC COR # BLD: 5.61 10*3/MM3 (ref 3.4–10.8)

## 2022-05-19 PROCEDURE — 36415 COLL VENOUS BLD VENIPUNCTURE: CPT

## 2022-05-19 PROCEDURE — 84153 ASSAY OF PSA TOTAL: CPT

## 2022-05-19 PROCEDURE — 84443 ASSAY THYROID STIM HORMONE: CPT

## 2022-05-19 PROCEDURE — 99214 OFFICE O/P EST MOD 30 MIN: CPT | Performed by: FAMILY MEDICINE

## 2022-05-19 PROCEDURE — 80053 COMPREHEN METABOLIC PANEL: CPT

## 2022-05-19 PROCEDURE — 85027 COMPLETE CBC AUTOMATED: CPT

## 2022-05-19 RX ORDER — HYDROCHLOROTHIAZIDE 25 MG/1
25 TABLET ORAL DAILY
Qty: 90 TABLET | Refills: 3 | Status: SHIPPED | OUTPATIENT
Start: 2022-05-19 | End: 2023-03-21 | Stop reason: SDUPTHER

## 2022-05-19 RX ORDER — ROSUVASTATIN CALCIUM 20 MG/1
20 TABLET, COATED ORAL NIGHTLY
Qty: 90 TABLET | Refills: 3 | Status: SHIPPED | OUTPATIENT
Start: 2022-05-19 | End: 2023-03-21 | Stop reason: SDUPTHER

## 2022-05-19 RX ORDER — ZOLPIDEM TARTRATE 5 MG/1
5 TABLET ORAL NIGHTLY
Qty: 90 TABLET | Refills: 1 | Status: SHIPPED | OUTPATIENT
Start: 2022-05-19 | End: 2022-09-20 | Stop reason: SDUPTHER

## 2022-05-19 NOTE — PROGRESS NOTES
Magnus Rousseau presents to [facility].    Chief Complaint:  Blood pressure, cholesterol, sleep issues    Subjective       History of Present Illness:  Magnus is in today for follow up on sleep issues.  He has been on Ambien for some time and says that he is not able to sleep well without it.  He denies acute issue related to this or side effects. Dutch reviewed.    He has hypertension for which he remains on multiple medications.  He does have atrial fibrillation as well.  His blood pressure is well controlled today.  He denies any obvious new side effects or concerns related to the medications.    He also has elevated cholesterol and continues to take rosuvastatin 20 mg daily.      Review of Systems:  Review of Systems   Constitutional: Negative for chills and fever.   Respiratory: Negative for cough and shortness of breath.    Cardiovascular: Negative for chest pain and palpitations.   Gastrointestinal: Negative for abdominal pain, nausea and vomiting.        Objective   Medical History:  Past Medical History:   • Elevated prostate specific antigen (PSA)   • Essential hypertension   • Mixed hyperlipidemia   • Other long term (current) drug therapy   • Primary insomnia   • Unspecified atrial fibrillation (HCC)     Past Surgical History:   • APPENDECTOMY   • PACEMAKER IMPLANTATION   • RECTAL FISTULA CLOSURE WITH FIBRIN GLUE      Family History   Problem Relation Age of Onset   • Other Mother         cause of death suicide   • Coronary artery disease Father    • Cancer Brother         unspecified     Social History     Tobacco Use   • Smoking status: Never Smoker   • Smokeless tobacco: Never Used   Substance Use Topics   • Alcohol use: Yes     Alcohol/week: 1.0 standard drink     Types: 1 Cans of beer per week     Comment: maybe 1 beer a night        Health Maintenance Due   Topic Date Due   • TDAP/TD VACCINES (1 - Tdap) Never done   • ZOSTER VACCINE (1 of 2) Never done     "    Immunization History   Administered Date(s) Administered   • COVID-19 (MODERNA) 1st, 2nd, 3rd Dose Only 02/27/2021, 03/27/2021   • COVID-19 (MODERNA) BOOSTER 01/30/2022   • Flu Vaccine Intradermal Quad 18-64YR 09/21/2012, 10/01/2020   • Fluzone High Dose =>65 Years ([employer] ONLY) 10/11/2013, 11/06/2017   • Pneumococcal Conjugate 13-Valent (PCV13) 06/27/2019   • Pneumococcal Polysaccharide (PPSV23) 03/05/2011       No Known Allergies     Medications:  Current Outpatient Medications on File Prior to Visit   Medication Sig   • amLODIPine (NORVASC) 2.5 MG tablet Take 2.5 mg by mouth Daily.   • dilTIAZem (TIAZAC) 300 MG 24 hr capsule diltiazem HCl 300 mg oral capsule, extended release take 1 capsule (300 mg) by oral route once daily   Active   • warfarin (COUMADIN) 2.5 MG tablet Take 1 tablet by mouth Daily. 2.5 mg 5 days a week, M & Thursday 5 mg   • warfarin (COUMADIN) 5 MG tablet Take 1 tablet by mouth Daily. 2.5 mg 5 days a week, M & Thursday 5 mg   • [DISCONTINUED] hydroCHLOROthiazide (HYDRODIURIL) 25 MG tablet Take 1 tablet by mouth Daily.   • [DISCONTINUED] rosuvastatin (CRESTOR) 20 MG tablet Take 1 tablet by mouth Daily.   • [DISCONTINUED] zolpidem (AMBIEN) 5 MG tablet Take 1 tablet by mouth Every Night.     No current facility-administered medications on file prior to visit.       Vital Signs:   /83 (BP Location: Left arm, Patient Position: Sitting, Cuff Size: Adult)   Pulse 72   Temp 97.7 °F (36.5 °C) (Oral)   Ht 174 cm (68.5\")   Wt 98.4 kg (217 lb)   BMI 32.51 kg/m²       Physical Exam:  Physical Exam  Vitals reviewed.   Constitutional:       General: He is not in acute distress.     Appearance: He is not ill-appearing.   Eyes:      Pupils: Pupils are equal, round, and reactive to light.   Neck:      Comments: No thyromegaly  Cardiovascular:      Rate and Rhythm: Normal rate and regular rhythm.   Pulmonary:      Effort: Pulmonary effort is normal.      Breath sounds: Normal breath sounds. "   Abdominal:      General: There is no distension.      Palpations: Abdomen is soft.      Tenderness: There is no abdominal tenderness.   Musculoskeletal:      Cervical back: Neck supple.   Lymphadenopathy:      Cervical: No cervical adenopathy.   Skin:     Findings: No lesion or rash.   Neurological:      Mental Status: He is alert.         Result Review      The following data was reviewed by Marco Verdin MD on 05/19/2022.  Lab Results   Component Value Date    WBC 5.04 10/26/2021    HGB 16.2 10/26/2021    HCT 46.9 10/26/2021    MCV 93.8 10/26/2021     10/26/2021     Lab Results   Component Value Date    GLUCOSE 112 (H) 10/26/2021    BUN 9 10/26/2021    CREATININE 1.08 10/26/2021     10/26/2021    K 4.0 10/26/2021     10/26/2021    CO2 29.2 (H) 10/26/2021    CALCIUM 9.4 10/26/2021    PROTEINTOT 6.7 10/26/2021    ALBUMIN 4.20 10/26/2021    ALT 31 10/26/2021    AST 33 10/26/2021    ALKPHOS 61 10/26/2021    BILITOT 0.6 10/26/2021    EGFRIFNONA 66 10/26/2021    GLOB 2.5 10/26/2021    AGRATIO 1.7 10/26/2021    BCR 8.3 10/26/2021    ANIONGAP 6.8 10/26/2021      Lab Results   Component Value Date    CHOL 117 10/26/2021    CHLPL 130 11/23/2020    TRIG 98 10/26/2021    HDL 35 (L) 10/26/2021    LDL 63 10/26/2021     Lab Results   Component Value Date    TSH 2.100 10/26/2021     Lab Results   Component Value Date    HGBA1C 5.60 10/26/2021     Lab Results   Component Value Date    PSA 5.620 (H) 10/26/2021    PSA 5.93 (H) 05/13/2021    PSA 5.25 (H) 11/23/2020            Assessment and Plan:   Today, we have reviewed his care.  Magnus is doing well overall.  We will refill needed medications and update labs today.  We will plan to see him back in about 4 months for his wellness exam.  Otherwise, no near-term changes anticipated.  He has not had significant difficulty with Ambien and tolerates it well.  I do not see an opportunity to change it yet.       Diagnoses and all orders for this visit:    1.  Primary insomnia (Primary)  -     zolpidem (AMBIEN) 5 MG tablet; Take 1 tablet by mouth Every Night.  Dispense: 90 tablet; Refill: 1    2. Other long term (current) drug therapy  -     zolpidem (AMBIEN) 5 MG tablet; Take 1 tablet by mouth Every Night.  Dispense: 90 tablet; Refill: 1    3. Essential hypertension  -     hydroCHLOROthiazide (HYDRODIURIL) 25 MG tablet; Take 1 tablet by mouth Daily.  Dispense: 90 tablet; Refill: 3  -     Comprehensive Metabolic Panel; Future    4. Mixed hyperlipidemia  -     rosuvastatin (CRESTOR) 20 MG tablet; Take 1 tablet by mouth Every Night.  Dispense: 90 tablet; Refill: 3  -     Comprehensive Metabolic Panel; Future  -     TSH; Future    5. Atrial fibrillation, unspecified type (HCC)  -     CBC (No Diff); Future    6. Elevated PSA  -     PSA DIAGNOSTIC; Future          Follow Up   Return in about 4 months (around 9/20/2022) for Medicare Wellness.  Patient was given instructions and counseling regarding his condition or for health maintenance advice. Please see specific information pulled into the AVS if appropriate.

## 2022-06-21 ENCOUNTER — OFFICE VISIT (OUTPATIENT)
Dept: FAMILY MEDICINE CLINIC | Age: 77
End: 2022-06-21

## 2022-06-21 VITALS
SYSTOLIC BLOOD PRESSURE: 132 MMHG | DIASTOLIC BLOOD PRESSURE: 62 MMHG | BODY MASS INDEX: 31.9 KG/M2 | HEART RATE: 79 BPM | HEIGHT: 69 IN | WEIGHT: 215.4 LBS | TEMPERATURE: 97.9 F

## 2022-06-21 DIAGNOSIS — M70.50 PES ANSERINE BURSITIS: Primary | ICD-10-CM

## 2022-06-21 PROCEDURE — 99213 OFFICE O/P EST LOW 20 MIN: CPT | Performed by: FAMILY MEDICINE

## 2022-06-21 RX ORDER — METHYLPREDNISOLONE 4 MG/1
TABLET ORAL
Qty: 1 EACH | Refills: 0 | Status: SHIPPED | OUTPATIENT
Start: 2022-06-21 | End: 2022-08-17

## 2022-06-21 NOTE — PROGRESS NOTES
Magnus Rousseau presents to [facility].    Chief Complaint:  Right knee pain    Subjective       History of Present Illness:  Magnus is in today for follow-up on right knee pain.  He believes that he may have injured the knee while doing some work around the home.  He says that he was sliding a heavy concrete block with the inside of the right leg.  He has been having pain for about 10 days or so at this point.  He has no pain when he is at rest in his recliner.  He had some pain yesterday using the brake and accelerator in his vehicle.  He is able to walk relatively well.  The leg is tender when he does that, but it is not that bad.  He has been using a knee sleeve that he picked up over-the-counter.  He has taken some Tylenol for this, but it is not helpful.      Review of Systems:  Review of Systems   Constitutional: Negative for chills and fever.   Respiratory: Negative for cough and shortness of breath.    Cardiovascular: Negative for chest pain and palpitations.   Gastrointestinal: Negative for abdominal pain, nausea and vomiting.        Objective   Medical History:  Past Medical History:   • Elevated prostate specific antigen (PSA)   • Essential hypertension   • Mixed hyperlipidemia   • Other long term (current) drug therapy   • Primary insomnia   • Unspecified atrial fibrillation (HCC)     Past Surgical History:   • APPENDECTOMY   • PACEMAKER IMPLANTATION   • RECTAL FISTULA CLOSURE WITH FIBRIN GLUE      Family History   Problem Relation Age of Onset   • Suicide Attempts Mother    • Coronary artery disease Father    • Cancer Brother         unspecified   • Heart attack Brother      Social History     Tobacco Use   • Smoking status: Never Smoker   • Smokeless tobacco: Never Used   Substance Use Topics   • Alcohol use: Yes     Alcohol/week: 1.0 standard drink     Types: 1 Cans of beer per week     Comment: maybe 1 beer a night        Health Maintenance Due   Topic Date Due   • TDAP/TD  "VACCINES (1 - Tdap) Never done   • ZOSTER VACCINE (1 of 2) Never done   • COVID-19 Vaccine (4 - Booster for Moderna series) 05/30/2022        Immunization History   Administered Date(s) Administered   • COVID-19 (MODERNA) 1st, 2nd, 3rd Dose Only 02/27/2021, 03/27/2021   • COVID-19 (MODERNA) BOOSTER 01/30/2022   • Flu Vaccine Intradermal Quad 18-64YR 09/21/2012, 10/01/2020   • Fluzone High Dose =>65 Years ([facility] ONLY) 10/11/2013, 11/06/2017   • Pneumococcal Conjugate 13-Valent (PCV13) 06/27/2019   • Pneumococcal Polysaccharide (PPSV23) 03/05/2011       No Known Allergies     Medications:  Current Outpatient Medications on File Prior to Visit   Medication Sig   • amLODIPine (NORVASC) 2.5 MG tablet Take 2.5 mg by mouth Daily.   • hydroCHLOROthiazide (HYDRODIURIL) 25 MG tablet Take 1 tablet by mouth Daily.   • rosuvastatin (CRESTOR) 20 MG tablet Take 1 tablet by mouth Every Night.   • warfarin (COUMADIN) 2.5 MG tablet Take 1 tablet by mouth Daily. 2.5 mg 5 days a week, M & Thursday 5 mg   • warfarin (COUMADIN) 5 MG tablet Take 1 tablet by mouth Daily. 2.5 mg 5 days a week, M & Thursday 5 mg   • zolpidem (AMBIEN) 5 MG tablet Take 1 tablet by mouth Every Night.   • dilTIAZem (TIAZAC) 300 MG 24 hr capsule diltiazem HCl 300 mg oral capsule, extended release take 1 capsule (300 mg) by oral route once daily   Active     No current facility-administered medications on file prior to visit.       Vital Signs:   /62 (BP Location: Left arm, Patient Position: Sitting)   Pulse 79   Temp 97.9 °F (36.6 °C) (Oral)   Ht 174 cm (68.5\")   Wt 97.7 kg (215 lb 6.4 oz)   BMI 32.27 kg/m²       Physical Exam:  Physical Exam  Vitals reviewed.   Constitutional:       General: He is not in acute distress.     Appearance: He is not ill-appearing.   Neck:      Comments: No thyromegaly  Cardiovascular:      Rate and Rhythm: Normal rate and regular rhythm.   Pulmonary:      Effort: Pulmonary effort is normal.   Musculoskeletal:      " Comments: There is no significant joint edema noted of the right knee.  No posterior tenderness.  No joint line tenderness.  There is some tenderness on the medial portion of the inferior knee.  Cici testing is negative.   Skin:     Findings: No lesion or rash.   Neurological:      Mental Status: He is alert.         Result Review      The following data was reviewed by Marco Verdin MD on 06/21/2022.  Lab Results   Component Value Date    WBC 5.61 05/19/2022    HGB 16.1 05/19/2022    HCT 47.9 05/19/2022    MCV 95.0 05/19/2022     05/19/2022     Lab Results   Component Value Date    GLUCOSE 107 (H) 05/19/2022    BUN 15 05/19/2022    CREATININE 0.99 05/19/2022     05/19/2022    K 3.8 05/19/2022     05/19/2022    CO2 27.0 05/19/2022    CALCIUM 9.5 05/19/2022    PROTEINTOT 6.5 05/19/2022    ALBUMIN 4.20 05/19/2022    ALT 36 05/19/2022    AST 35 05/19/2022    ALKPHOS 63 05/19/2022    BILITOT 0.6 05/19/2022    EGFRIFNONA 66 10/26/2021    GLOB 2.3 05/19/2022    AGRATIO 1.8 05/19/2022    BCR 15.2 05/19/2022    ANIONGAP 10.0 05/19/2022      Lab Results   Component Value Date    CHOL 117 10/26/2021    CHLPL 130 11/23/2020    TRIG 98 10/26/2021    HDL 35 (L) 10/26/2021    LDL 63 10/26/2021     Lab Results   Component Value Date    TSH 2.150 05/19/2022     Lab Results   Component Value Date    HGBA1C 5.60 10/26/2021     Lab Results   Component Value Date    PSA 5.790 (H) 05/19/2022    PSA 5.620 (H) 10/26/2021    PSA 5.93 (H) 05/13/2021            Assessment and Plan:   Today, we have reviewed Magnus's care.  He has a stable knee, and there is not clear evidence that would suggest a cartilage injury.  The location of his pain would be consistent with a pes bursitis.  For this reason, we will cover him with a steroid pack as noted below.  I have also recommended he apply ice to the knee for 10 or 15 minutes once or twice daily.  We will hold off on x-rays today.  However, if the knee does not show  improvement over the next 7 days or so, then we may need to consider additional evaluation.  We will follow from there.       Diagnoses and all orders for this visit:    1. Pes anserine bursitis (Primary)  -     methylPREDNISolone (MEDROL) 4 MG dose pack; Take as directed on package instructions.  Dispense: 1 each; Refill: 0          Follow Up   Return in about 13 weeks (around 9/20/2022) for Recheck, as scheduled.  Patient was given instructions and counseling regarding his condition or for health maintenance advice. Please see specific information pulled into the AVS if appropriate.

## 2022-07-05 ENCOUNTER — HOSPITAL ENCOUNTER (OUTPATIENT)
Dept: GENERAL RADIOLOGY | Facility: HOSPITAL | Age: 77
Discharge: HOME | End: 2022-07-05
Admitting: FAMILY MEDICINE

## 2022-07-05 ENCOUNTER — TELEPHONE (OUTPATIENT)
Dept: FAMILY MEDICINE CLINIC | Age: 77
End: 2022-07-05

## 2022-07-05 DIAGNOSIS — M25.561 ACUTE PAIN OF RIGHT KNEE: Primary | ICD-10-CM

## 2022-07-05 DIAGNOSIS — M25.561 ACUTE PAIN OF RIGHT KNEE: ICD-10-CM

## 2022-07-05 PROCEDURE — 73562 X-RAY EXAM OF KNEE 3: CPT

## 2022-07-05 NOTE — TELEPHONE ENCOUNTER
I have placed an order for an x-ray of the right knee.  I would recommend he go ahead and have that today or tomorrow if possible.  Depending on what that shows, I may want to move ahead with some additional treatment or possibly refer.  Thanks.

## 2022-07-05 NOTE — TELEPHONE ENCOUNTER
Caller: Magnus Rousseau    Relationship: Self    Best call back number: [phone]    What is the best time to reach you: ANYTIME    Who are you requesting to speak with (clinical staff, provider,  specific staff member): CLINICAL    What was the call regarding: PATIENT WAS SEEN IN OFFICE ON 06/21/2022 REGARDING KNEE PAIN. HE WAS ADVISED TO CALL BACK IF IT HAS NOT IMPROVED, HE MENTIONED THAT IT IS NOT WORSE BUT FEELING THE SAME AS WHEN HE WAS SEEN. HE WOULD LIKE A CALL TO DISCUSS FURTHER PLANS REGARDING THIS.     Do you require a callback: YES

## 2022-08-17 ENCOUNTER — OFFICE VISIT (OUTPATIENT)
Dept: FAMILY MEDICINE CLINIC | Age: 77
End: 2022-08-17

## 2022-08-17 VITALS
HEIGHT: 69 IN | HEART RATE: 69 BPM | TEMPERATURE: 98.2 F | BODY MASS INDEX: 31.19 KG/M2 | DIASTOLIC BLOOD PRESSURE: 78 MMHG | SYSTOLIC BLOOD PRESSURE: 144 MMHG | WEIGHT: 210.6 LBS | OXYGEN SATURATION: 95 %

## 2022-08-17 DIAGNOSIS — S61.412A LACERATION OF LEFT HAND WITHOUT FOREIGN BODY, INITIAL ENCOUNTER: Primary | ICD-10-CM

## 2022-08-17 PROCEDURE — 12001 RPR S/N/AX/GEN/TRNK 2.5CM/<: CPT | Performed by: PHYSICIAN ASSISTANT

## 2022-08-17 PROCEDURE — 99214 OFFICE O/P EST MOD 30 MIN: CPT | Performed by: PHYSICIAN ASSISTANT

## 2022-08-17 RX ORDER — CEPHALEXIN 500 MG/1
500 CAPSULE ORAL 2 TIMES DAILY
Qty: 10 CAPSULE | Refills: 0 | Status: SHIPPED | OUTPATIENT
Start: 2022-08-17 | End: 2022-08-23 | Stop reason: SDUPTHER

## 2022-08-17 NOTE — PROGRESS NOTES
Subjective     CHIEF COMPLAINT    Chief Complaint   Patient presents with   • Laceration     (L) palm between index and thumb. Pt was out in the garage and cut it on a  today             This is a 77-year-old male presenting to the clinic complaining of a laceration to his left hand.  This occurred about 30 to 60 minutes prior to arrival at the clinic.  He was working in his garage sanding a piece of plywood and he cut his hand on the dyan.  He is unsure when his last tetanus vaccination was.            Review of Systems   Constitutional: Negative for chills and fever.   Skin: Positive for wound.   Neurological: Negative for weakness and numbness.            Past Medical History:   Diagnosis Date   • Elevated prostate specific antigen (PSA)    • Essential hypertension    • Mixed hyperlipidemia    • Other long term (current) drug therapy    • Primary insomnia    • Unspecified atrial fibrillation (HCC)             Past Surgical History:   Procedure Laterality Date   • APPENDECTOMY     • PACEMAKER IMPLANTATION     • RECTAL FISTULA CLOSURE WITH FIBRIN GLUE              Family History   Problem Relation Age of Onset   • Suicide Attempts Mother    • Coronary artery disease Father    • Cancer Brother         unspecified   • Heart attack Brother             Social History     Socioeconomic History   • Marital status:    • Number of children: 2   Tobacco Use   • Smoking status: Never Smoker   • Smokeless tobacco: Never Used   Substance and Sexual Activity   • Alcohol use: Yes     Alcohol/week: 1.0 standard drink     Types: 1 Cans of beer per week     Comment: maybe 1 beer a night    • Drug use: Defer   • Sexual activity: Defer            No Known Allergies         Current Outpatient Medications on File Prior to Visit   Medication Sig Dispense Refill   • amLODIPine (NORVASC) 2.5 MG tablet Take 2.5 mg by mouth Daily.     • hydroCHLOROthiazide (HYDRODIURIL) 25 MG tablet Take 1 tablet by mouth Daily. 90  "tablet 3   • rosuvastatin (CRESTOR) 20 MG tablet Take 1 tablet by mouth Every Night. 90 tablet 3   • warfarin (COUMADIN) 2.5 MG tablet Take 1 tablet by mouth Daily. 2.5 mg 5 days a week, M & Thursday 5 mg     • warfarin (COUMADIN) 5 MG tablet Take 1 tablet by mouth Daily. 2.5 mg 5 days a week, M & Thursday 5 mg     • zolpidem (AMBIEN) 5 MG tablet Take 1 tablet by mouth Every Night. 90 tablet 1   • [DISCONTINUED] methylPREDNISolone (MEDROL) 4 MG dose pack Take as directed on package instructions. 1 each 0     No current facility-administered medications on file prior to visit.            /78 (BP Location: Right arm, Patient Position: Sitting, Cuff Size: Adult)   Pulse 69   Temp 98.2 °F (36.8 °C) (Oral)   Ht 174 cm (68.5\")   Wt 95.5 kg (210 lb 9.6 oz)   SpO2 95% Comment: room air  BMI 31.55 kg/m²          Objective     Physical Exam  Vitals and nursing note reviewed.   Constitutional:       General: He is not in acute distress.     Appearance: Normal appearance.   Pulmonary:      Effort: Pulmonary effort is normal. No respiratory distress.   Musculoskeletal:      Left hand: Laceration present. Normal range of motion. Normal strength. Normal sensation. Normal capillary refill.        Hands:    Skin:     General: Skin is warm and dry.   Neurological:      Mental Status: He is alert and oriented to person, place, and time.   Psychiatric:         Mood and Affect: Mood normal.         Behavior: Behavior normal.              Laceration Repair    Date/Time: 8/17/2022 4:40 PM  Performed by: Porsha Dennis PA-C  Authorized by: Porsha Dennis PA-C   Body area: upper extremity  Location details: left hand  Laceration length: 2 cm  Foreign bodies: no foreign bodies  Tendon involvement: none  Nerve involvement: none  Vascular damage: no  Anesthesia: local infiltration    Anesthesia:  Local Anesthetic: lidocaine 1% without epinephrine  Anesthetic total: 3 mL    Sedation:  Patient sedated: no    Preparation: " Patient was prepped and draped in the usual sterile fashion.  Irrigation solution: saline  Irrigation method: syringe  Amount of cleaning: standard  Debridement: none  Degree of undermining: none  Skin closure: 5-0 nylon  Number of sutures: 4  Technique: simple  Approximation: close  Approximation difficulty: simple  Dressing: antibiotic ointment  Patient tolerance: patient tolerated the procedure well with no immediate complications                          Lab Results (last 24 hours)     ** No results found for the last 24 hours. **                No Radiology Exams Resulted Within Past 24 Hours                    Diagnoses and all orders for this visit:    1. Laceration of left hand without foreign body, initial encounter (Primary)  Comments:  Keep wound clean and dry, per instructions. FU for suture removal in 10-14 days or sooner PRN.   Orders:  -     cephalexin (Keflex) 500 MG capsule; Take 1 capsule by mouth 2 (Two) Times a Day for 5 days. May stop if no concerns of infection after 3 days.  Dispense: 10 capsule; Refill: 0  -     mupirocin (BACTROBAN) 2 % ointment; Apply 1 application topically to the appropriate area as directed 2 (Two) Times a Day.  Dispense: 15 g; Refill: 0  -     tetanus-diphtheria toxoids (TENIVAC 5-2) injection 0.5 mL  -     Laceration Repair          FOR FULL DISCHARGE INSTRUCTIONS/COMMENTS/HANDOUTS please see the AVS

## 2022-08-17 NOTE — PATIENT INSTRUCTIONS
Magnus thank you for letting me care for you today!    Please keep your wound clean and dry. Do not get your cut wet or remove our bandage for the first 24 hours. After that, wash once daily and as needed with mild soap and water. If you have any concerns for infection such as swelling, increasing redness or pain, or drainage, please contact the office.     Please return to the office in 10-14 days for suture removal.

## 2022-08-23 ENCOUNTER — OFFICE VISIT (OUTPATIENT)
Dept: FAMILY MEDICINE CLINIC | Age: 77
End: 2022-08-23

## 2022-08-23 VITALS
TEMPERATURE: 97.8 F | HEART RATE: 77 BPM | SYSTOLIC BLOOD PRESSURE: 157 MMHG | BODY MASS INDEX: 31.07 KG/M2 | WEIGHT: 209.8 LBS | HEIGHT: 69 IN | DIASTOLIC BLOOD PRESSURE: 84 MMHG

## 2022-08-23 DIAGNOSIS — T50.Z95A ADVERSE EFFECT OF VACCINE, INITIAL ENCOUNTER: Primary | ICD-10-CM

## 2022-08-23 PROCEDURE — 99213 OFFICE O/P EST LOW 20 MIN: CPT | Performed by: FAMILY MEDICINE

## 2022-08-23 RX ORDER — CEPHALEXIN 500 MG/1
500 CAPSULE ORAL 2 TIMES DAILY
Qty: 10 CAPSULE | Refills: 0 | Status: SHIPPED | OUTPATIENT
Start: 2022-08-23 | End: 2022-08-29

## 2022-08-23 RX ORDER — HYDROCODONE BITARTRATE AND ACETAMINOPHEN 5; 325 MG/1; MG/1
.5-1 TABLET ORAL EVERY 6 HOURS PRN
Qty: 12 TABLET | Refills: 0 | Status: SHIPPED | OUTPATIENT
Start: 2022-08-23 | End: 2022-09-20

## 2022-08-23 NOTE — PROGRESS NOTES
Magnus Rousseau presents to [facility].    Chief Complaint:  Arm pain    Subjective       History of Present Illness:  Magnus is in today for follow-up on left arm pain.  He received a tetanus shot 5 days ago, Td, which was placed in the left deltoid.  He did okay with the vaccine initially.  However, in the last day, he developed significant pain, particularly with range of motion.  He denies fever or chills.    Review of Systems:  Review of Systems   Constitutional: Negative for chills and fever.   Respiratory: Negative for cough and shortness of breath.    Cardiovascular: Negative for chest pain and palpitations.   Gastrointestinal: Negative for abdominal pain, nausea and vomiting.        Objective   Medical History:  Past Medical History:   • Elevated prostate specific antigen (PSA)   • Essential hypertension   • Mixed hyperlipidemia   • Other long term (current) drug therapy   • Primary insomnia   • Unspecified atrial fibrillation (HCC)     Past Surgical History:   • APPENDECTOMY   • PACEMAKER IMPLANTATION   • RECTAL FISTULA CLOSURE WITH FIBRIN GLUE      Family History   Problem Relation Age of Onset   • Suicide Attempts Mother    • Coronary artery disease Father    • Cancer Brother         unspecified   • Heart attack Brother      Social History     Tobacco Use   • Smoking status: Never Smoker   • Smokeless tobacco: Never Used   Substance Use Topics   • Alcohol use: Yes     Alcohol/week: 1.0 standard drink     Types: 1 Cans of beer per week     Comment: maybe 1 beer a night        Health Maintenance Due   Topic Date Due   • ZOSTER VACCINE (1 of 2) Never done   • COVID-19 Vaccine (4 - Booster for Moderna series) 05/30/2022        Immunization History   Administered Date(s) Administered   • COVID-19 (MODERNA) 1st, 2nd, 3rd Dose Only 02/27/2021, 03/27/2021   • COVID-19 (MODERNA) BOOSTER 01/30/2022   • Flu Vaccine Intradermal Quad 18-64YR 09/21/2012, 10/01/2020   • Fluzone High Dose =>65  "Years ([facility] ONLY) 10/11/2013, 11/06/2017   • Pneumococcal Conjugate 13-Valent (PCV13) 06/27/2019   • Pneumococcal Polysaccharide (PPSV23) 03/05/2011   • Td 08/17/2022       No Known Allergies     Medications:  Current Outpatient Medications on File Prior to Visit   Medication Sig   • amLODIPine (NORVASC) 2.5 MG tablet Take 2.5 mg by mouth Daily.   • hydroCHLOROthiazide (HYDRODIURIL) 25 MG tablet Take 1 tablet by mouth Daily.   • mupirocin (BACTROBAN) 2 % ointment Apply 1 application topically to the appropriate area as directed 2 (Two) Times a Day.   • rosuvastatin (CRESTOR) 20 MG tablet Take 1 tablet by mouth Every Night.   • warfarin (COUMADIN) 2.5 MG tablet Take 1 tablet by mouth Daily. 2.5 mg 5 days a week, M & Thursday 5 mg   • warfarin (COUMADIN) 5 MG tablet Take 1 tablet by mouth Daily. 2.5 mg 5 days a week, M & Thursday 5 mg   • zolpidem (AMBIEN) 5 MG tablet Take 1 tablet by mouth Every Night.   • [DISCONTINUED] cephalexin (Keflex) 500 MG capsule Take 1 capsule by mouth 2 (Two) Times a Day for 5 days. May stop if no concerns of infection after 3 days.     No current facility-administered medications on file prior to visit.       Vital Signs:   /84 (BP Location: Right arm, Patient Position: Sitting)   Pulse 77   Temp 97.8 °F (36.6 °C) (Oral)   Ht 174 cm (68.5\")   Wt 95.2 kg (209 lb 12.8 oz)   BMI 31.43 kg/m²       Physical Exam:  Physical Exam  Vitals reviewed.   Constitutional:       General: He is not in acute distress.     Appearance: He is not ill-appearing.   Eyes:      Pupils: Pupils are equal, round, and reactive to light.   Neck:      Comments: No thyromegaly  Pulmonary:      Effort: Pulmonary effort is normal.   Musculoskeletal:         General: Tenderness (There is mild tenderness on palpation over the left lateral arm proximally.  There is no obvious significant redness or mass.  There is no sign of abscess.  He has very significant pain with range of motion though.) present.      " Cervical back: Neck supple.   Lymphadenopathy:      Cervical: No cervical adenopathy.   Skin:     Findings: No erythema, lesion or rash.   Neurological:      Mental Status: He is alert.         Result Review      The following data was reviewed by Marco Verdin MD on 08/23/2022.  Lab Results   Component Value Date    WBC 5.61 05/19/2022    HGB 16.1 05/19/2022    HCT 47.9 05/19/2022    MCV 95.0 05/19/2022     05/19/2022     Lab Results   Component Value Date    GLUCOSE 107 (H) 05/19/2022    BUN 15 05/19/2022    CREATININE 0.99 05/19/2022     05/19/2022    K 3.8 05/19/2022     05/19/2022    CO2 27.0 05/19/2022    CALCIUM 9.5 05/19/2022    PROTEINTOT 6.5 05/19/2022    ALBUMIN 4.20 05/19/2022    ALT 36 05/19/2022    AST 35 05/19/2022    ALKPHOS 63 05/19/2022    BILITOT 0.6 05/19/2022    EGFRIFNONA 66 10/26/2021    GLOB 2.3 05/19/2022    AGRATIO 1.8 05/19/2022    BCR 15.2 05/19/2022    ANIONGAP 10.0 05/19/2022      Lab Results   Component Value Date    CHOL 117 10/26/2021    CHLPL 130 11/23/2020    TRIG 98 10/26/2021    HDL 35 (L) 10/26/2021    LDL 63 10/26/2021     Lab Results   Component Value Date    TSH 2.150 05/19/2022     Lab Results   Component Value Date    HGBA1C 5.60 10/26/2021     Lab Results   Component Value Date    PSA 5.790 (H) 05/19/2022    PSA 5.620 (H) 10/26/2021    PSA 5.93 (H) 05/13/2021            Assessment and Plan:   Today, we have reviewed his care.  There is no obvious sign of abscess on exam.  It remains unclear if he is having some type of delayed reaction to the vaccine itself or possibly some other process with the shoulder proper.  For the short-term, we will recommend an additional 5 days of cephalexin as a precaution.  The pain he has is pretty significant, and I will cover him with hydrocodone.  He has previously taken this.  Risks are discussed including impairment, overdose, and addiction.  We will obtain his written consent for this.       Diagnoses and all  orders for this visit:    1. Adverse effect of vaccine, initial encounter (Primary)  -     cephalexin (Keflex) 500 MG capsule; Take 1 capsule by mouth 2 (Two) Times a Day for 5 days.  Dispense: 10 capsule; Refill: 0  -     HYDROcodone-acetaminophen (NORCO) 5-325 MG per tablet; Take 0.5-1 tablets by mouth Every 6 (Six) Hours As Needed for Moderate Pain .  Dispense: 12 tablet; Refill: 0          Follow Up   Return if symptoms worsen or fail to improve.  Patient was given instructions and counseling regarding his condition or for health maintenance advice. Please see specific information pulled into the AVS if appropriate.

## 2022-08-24 ENCOUNTER — TELEPHONE (OUTPATIENT)
Dept: FAMILY MEDICINE CLINIC | Age: 77
End: 2022-08-24

## 2022-08-24 NOTE — TELEPHONE ENCOUNTER
Provider: JANA SCHMIDT    Caller: ADILIA ZUÑIGA     Relationship to Patient: SELF     Pharmacy: [facility]    Phone Number: [phone]     Reason for Call: PATIENT WAS CALLING PROVIDER TO LET HIM KNOW HOW IS ARM WAS. PATIENT STATED IT IS STILL SORE BUT DOING A LOT BETTER THEN YESTERDAY

## 2022-08-25 NOTE — TELEPHONE ENCOUNTER
Noted.  I do not have new recommendations.  Hopefully, he will have gradual improvement in the pain.  Thanks.

## 2022-08-29 ENCOUNTER — OFFICE VISIT (OUTPATIENT)
Dept: FAMILY MEDICINE CLINIC | Age: 77
End: 2022-08-29

## 2022-08-29 VITALS
HEIGHT: 69 IN | TEMPERATURE: 98.7 F | HEART RATE: 77 BPM | WEIGHT: 210.8 LBS | SYSTOLIC BLOOD PRESSURE: 133 MMHG | OXYGEN SATURATION: 96 % | BODY MASS INDEX: 31.22 KG/M2 | DIASTOLIC BLOOD PRESSURE: 73 MMHG

## 2022-08-29 DIAGNOSIS — Z48.02 VISIT FOR SUTURE REMOVAL: Primary | ICD-10-CM

## 2022-08-29 DIAGNOSIS — S61.412A LACERATION OF LEFT HAND WITHOUT FOREIGN BODY, INITIAL ENCOUNTER: ICD-10-CM

## 2022-08-29 DIAGNOSIS — M79.602 LEFT ARM PAIN: ICD-10-CM

## 2022-08-29 PROCEDURE — 99213 OFFICE O/P EST LOW 20 MIN: CPT | Performed by: PHYSICIAN ASSISTANT

## 2022-08-29 NOTE — PROGRESS NOTES
Subjective     CHIEF COMPLAINT    Chief Complaint   Patient presents with   • Suture / Staple Removal     (L) hand.    • Arm Pain     Pt had a tetanus shot on 8/17 and states his ROM is limited and is still sore. Pt seen Dr. Verdin regarding this and he prescribed pain meds. Pt states ROM is still not fully there.             This is a 77-year-old male presenting the clinic for suture removal from his left hand.  He was seen in this office and had 4 sutures placed after a laceration to left hand on 8/17/2022.  He endorses some continued mild soreness but overall states his hand is improving.  Denies any erythema or drainage around the lesion.    He additionally has had some left arm pain since getting his tetanus shot on the same day.  He states it was very sore for 1 to 2 days afterwards and then was beginning to improve.  However, on 8/22, it began worsening.  He was actually seen in the office by Dr. Verdin on 8/23 for this arm pain.  He states it hurts to move and he is not able to fully raise his left arm because of the pain.  Dr. Verdin prescribed him some hydrocodone and believed it was potentially secondary to the tetanus shot or could be a separate problem with the shoulder itself.  Since he was seen on 8/23, he has had improvement in his pain and range of motion but states it is not fully back to normal.            Review of Systems   Constitutional: Negative for chills and fever.   Musculoskeletal: Positive for myalgias (Left upper arm, deltoid area). Negative for arthralgias.   Skin: Positive for wound.   Neurological: Negative for weakness and numbness.            Past Medical History:   Diagnosis Date   • Elevated prostate specific antigen (PSA)    • Essential hypertension    • Mixed hyperlipidemia    • Other long term (current) drug therapy    • Primary insomnia    • Unspecified atrial fibrillation (HCC)             Past Surgical History:   Procedure Laterality Date   • APPENDECTOMY     • PACEMAKER  IMPLANTATION     • RECTAL FISTULA CLOSURE WITH FIBRIN GLUE              Family History   Problem Relation Age of Onset   • Suicide Attempts Mother    • Coronary artery disease Father    • Cancer Brother         unspecified   • Heart attack Brother             Social History     Socioeconomic History   • Marital status:    • Number of children: 2   Tobacco Use   • Smoking status: Never Smoker   • Smokeless tobacco: Never Used   Substance and Sexual Activity   • Alcohol use: Yes     Alcohol/week: 1.0 standard drink     Types: 1 Cans of beer per week     Comment: maybe 1 beer a night    • Drug use: Defer   • Sexual activity: Defer            No Known Allergies         Current Outpatient Medications on File Prior to Visit   Medication Sig Dispense Refill   • amLODIPine (NORVASC) 2.5 MG tablet Take 2.5 mg by mouth Daily.     • hydroCHLOROthiazide (HYDRODIURIL) 25 MG tablet Take 1 tablet by mouth Daily. 90 tablet 3   • HYDROcodone-acetaminophen (NORCO) 5-325 MG per tablet Take 0.5-1 tablets by mouth Every 6 (Six) Hours As Needed for Moderate Pain . 12 tablet 0   • mupirocin (BACTROBAN) 2 % ointment Apply 1 application topically to the appropriate area as directed 2 (Two) Times a Day. 15 g 0   • rosuvastatin (CRESTOR) 20 MG tablet Take 1 tablet by mouth Every Night. 90 tablet 3   • warfarin (COUMADIN) 2.5 MG tablet Take 1 tablet by mouth Daily. 2.5 mg 5 days a week, M & Thursday 5 mg     • warfarin (COUMADIN) 5 MG tablet Take 1 tablet by mouth Daily. 2.5 mg 5 days a week, M & Thursday 5 mg     • zolpidem (AMBIEN) 5 MG tablet Take 1 tablet by mouth Every Night. 90 tablet 1   • [DISCONTINUED] cephalexin (Keflex) 500 MG capsule Take 1 capsule by mouth 2 (Two) Times a Day for 5 days. 10 capsule 0     No current facility-administered medications on file prior to visit.            /73 (BP Location: Left arm, Patient Position: Sitting, Cuff Size: Adult)   Pulse 77   Temp 98.7 °F (37.1 °C) (Oral)   Ht 174 cm  "(68.5\")   Wt 95.6 kg (210 lb 12.8 oz)   SpO2 96% Comment: room air  BMI 31.58 kg/m²          Objective     Physical Exam  Vitals and nursing note reviewed.   Constitutional:       General: He is not in acute distress.     Appearance: Normal appearance.   Pulmonary:      Effort: Pulmonary effort is normal. No respiratory distress.   Musculoskeletal:      Left shoulder: Tenderness (anterior deltoid area) present. No swelling, deformity or bony tenderness. Decreased range of motion (painful shoulder flexion and extension). Normal pulse.   Skin:     General: Skin is warm and dry.      Comments: Well-healed 2 cm laceration to left palm   Neurological:      Mental Status: He is alert and oriented to person, place, and time.   Psychiatric:         Mood and Affect: Mood normal.         Behavior: Behavior normal.            Suture Removal    Date/Time: 8/29/2022 9:35 AM  Performed by: Porsha Dennis PA-C  Authorized by: Porsha Dennis PA-C   Body area: upper extremity  Location details: left hand  Wound Appearance: clean and pink  Sutures Removed: 4  Facility: sutures placed in this facility  Patient tolerance: patient tolerated the procedure well with no immediate complications                        Lab Results (last 24 hours)     ** No results found for the last 24 hours. **                No Radiology Exams Resulted Within Past 24 Hours                    Diagnoses and all orders for this visit:    1. Visit for suture removal (Primary)  Comments:  Wound looks great. OK to continue with antibiotic ointment as desired.   Orders:  -     Suture Removal    2. Left arm pain  Comments:  Seems muscular in nature and is improving. Offered x-ray but he declines now. Will continue with treatment and FU if improvment doesn't continue.     3. Laceration of left hand without foreign body, initial encounter  -     Suture Removal                           FOR FULL DISCHARGE INSTRUCTIONS/COMMENTS/HANDOUTS please see the " AVS

## 2022-09-20 ENCOUNTER — OFFICE VISIT (OUTPATIENT)
Dept: FAMILY MEDICINE CLINIC | Age: 77
End: 2022-09-20

## 2022-09-20 VITALS
WEIGHT: 208.8 LBS | BODY MASS INDEX: 30.93 KG/M2 | TEMPERATURE: 97.9 F | DIASTOLIC BLOOD PRESSURE: 71 MMHG | SYSTOLIC BLOOD PRESSURE: 136 MMHG | HEIGHT: 69 IN | HEART RATE: 72 BPM

## 2022-09-20 DIAGNOSIS — Z23 ENCOUNTER FOR IMMUNIZATION: ICD-10-CM

## 2022-09-20 DIAGNOSIS — R73.9 HYPERGLYCEMIA: ICD-10-CM

## 2022-09-20 DIAGNOSIS — Z79.899 OTHER LONG TERM (CURRENT) DRUG THERAPY: ICD-10-CM

## 2022-09-20 DIAGNOSIS — R97.20 ELEVATED PSA: ICD-10-CM

## 2022-09-20 DIAGNOSIS — F51.01 PRIMARY INSOMNIA: ICD-10-CM

## 2022-09-20 DIAGNOSIS — E78.2 MIXED HYPERLIPIDEMIA: ICD-10-CM

## 2022-09-20 DIAGNOSIS — I10 ESSENTIAL HYPERTENSION: ICD-10-CM

## 2022-09-20 DIAGNOSIS — Z00.00 PHYSICAL EXAM: Primary | ICD-10-CM

## 2022-09-20 LAB
AMPHET+METHAMPHET UR QL: NEGATIVE
AMPHETAMINES UR QL: NEGATIVE
BARBITURATES UR QL SCN: NEGATIVE
BENZODIAZ UR QL SCN: NEGATIVE
BUPRENORPHINE SERPL-MCNC: NEGATIVE NG/ML
CANNABINOIDS SERPL QL: NEGATIVE
COCAINE UR QL: NEGATIVE
EXPIRATION DATE: NORMAL
Lab: NORMAL
MDMA UR QL SCN: NEGATIVE
METHADONE UR QL SCN: NEGATIVE
OPIATES UR QL: NEGATIVE
OXYCODONE UR QL SCN: NEGATIVE
PCP UR QL SCN: NEGATIVE

## 2022-09-20 PROCEDURE — 99214 OFFICE O/P EST MOD 30 MIN: CPT | Performed by: FAMILY MEDICINE

## 2022-09-20 PROCEDURE — 1159F MED LIST DOCD IN RCRD: CPT | Performed by: FAMILY MEDICINE

## 2022-09-20 PROCEDURE — G0008 ADMIN INFLUENZA VIRUS VAC: HCPCS | Performed by: FAMILY MEDICINE

## 2022-09-20 PROCEDURE — G0439 PPPS, SUBSEQ VISIT: HCPCS | Performed by: FAMILY MEDICINE

## 2022-09-20 PROCEDURE — 90662 IIV NO PRSV INCREASED AG IM: CPT | Performed by: FAMILY MEDICINE

## 2022-09-20 PROCEDURE — 80305 DRUG TEST PRSMV DIR OPT OBS: CPT | Performed by: FAMILY MEDICINE

## 2022-09-20 PROCEDURE — 1125F AMNT PAIN NOTED PAIN PRSNT: CPT | Performed by: FAMILY MEDICINE

## 2022-09-20 PROCEDURE — 1170F FXNL STATUS ASSESSED: CPT | Performed by: FAMILY MEDICINE

## 2022-09-20 RX ORDER — ZOLPIDEM TARTRATE 5 MG/1
5 TABLET ORAL NIGHTLY
Qty: 90 TABLET | Refills: 1 | Status: SHIPPED | OUTPATIENT
Start: 2022-09-20 | End: 2023-03-21

## 2022-09-20 NOTE — PROGRESS NOTES
The ABCs of the Annual Wellness Visit  Subsequent Medicare Wellness Visit    Chief Complaint:  Medicare wellness exam, blood pressure, cholesterol, sleep    Subjective    History of Present Illness:  Magnus Rousseau is a 77 y.o. male who presents for a Subsequent Medicare Wellness Visit.    The following portions of the patient's history were reviewed and updated as appropriate: allergies, current medications, past family history, past medical history, past social history, past surgical history and problem list. {Optional Link Review (Popup) :23}    Compared to one year ago, the patient feels his physical health is worse.  He is having some joint issues.    Compared to one year ago, the patient feels his mental health is the same.    Recent Hospitalizations:  He was not admitted to the hospital during the last year.       Current Medical Providers:  Patient Care Team:  Marco Verdin MD as PCP - General (Family Medicine)    Outpatient Medications Prior to Visit   Medication Sig Dispense Refill   • amLODIPine (NORVASC) 2.5 MG tablet Take 2.5 mg by mouth Daily.     • hydroCHLOROthiazide (HYDRODIURIL) 25 MG tablet Take 1 tablet by mouth Daily. 90 tablet 3   • rosuvastatin (CRESTOR) 20 MG tablet Take 1 tablet by mouth Every Night. 90 tablet 3   • warfarin (COUMADIN) 2.5 MG tablet Take 1 tablet by mouth Daily. 2.5 mg 5 days a week, M & Thursday 5 mg     • warfarin (COUMADIN) 5 MG tablet Take 1 tablet by mouth Daily. 2.5 mg 5 days a week, M & Thursday 5 mg     • HYDROcodone-acetaminophen (NORCO) 5-325 MG per tablet Take 0.5-1 tablets by mouth Every 6 (Six) Hours As Needed for Moderate Pain . 12 tablet 0   • mupirocin (BACTROBAN) 2 % ointment Apply 1 application topically to the appropriate area as directed 2 (Two) Times a Day. 15 g 0   • zolpidem (AMBIEN) 5 MG tablet Take 1 tablet by mouth Every Night. 90 tablet 1     No facility-administered medications prior to visit.       No opioid medication identified on  "active medication list. I have reviewed chart for other potential  high risk medication/s and harmful drug interactions in the elderly.          Aspirin is not on active medication list.  Aspirin use is not indicated based on review of current medical condition/s. Risk of harm outweighs potential benefits.  .    Patient Active Problem List   Diagnosis   • Primary insomnia   • Essential hypertension   • Mixed hyperlipidemia   • Atrial fibrillation (HCC)   • Other long term (current) drug therapy     Advance Care Planning   Advance Directive is on file.  ACP discussion was held with the patient during this visit. Patient has an advance directive in EMR which is still valid. His wife, Miranda, would make decisions if needed.    In addition, Magnus is here for follow-up on his usual care.  He has significant insomnia for which he has taken Ambien for a long period of time.  He does tolerate that medication well and continues to feel it is necessary.  He denies any unusual sleep-related behaviors.  Dutch is reviewed.    Magnus also has hypertension and elevated cholesterol for which he remains on treatments as noted above.  He did have some blood work earlier this year that looked good overall.    Magnus also has an elevated PSA for which we have been following his level.  It remained elevated on the last check.        Review of Systems:  Review of Systems   Constitutional: Negative for chills and fever.   Respiratory: Negative for cough and shortness of breath.    Cardiovascular: Negative for chest pain and palpitations.   Gastrointestinal: Negative for abdominal pain, nausea and vomiting.         Objective       Vitals:    09/20/22 1057   BP: 136/71   BP Location: Right arm   Patient Position: Sitting   Pulse: 72   Temp: 97.9 °F (36.6 °C)   TempSrc: Oral   Weight: 94.7 kg (208 lb 12.8 oz)   Height: 174 cm (68.5\")   PainSc:   2   PainLoc: Arm     BMI Readings from Last 1 Encounters:   09/20/22 31.28 kg/m²   BMI is above " normal parameters. Recommendations include: exercise counseling and nutrition counseling    Does the patient have evidence of cognitive impairment? No    Physical Exam  Vitals and nursing note reviewed.   Constitutional:       General: He is not in acute distress.     Appearance: He is not ill-appearing.   HENT:      Right Ear: Tympanic membrane and ear canal normal.      Left Ear: Tympanic membrane and ear canal normal.      Ears:      Comments: Hearing is normal bilaterally with forced whisper.     Mouth/Throat:      Mouth: Mucous membranes are moist.      Comments: Pharynx appears normal  Eyes:      Extraocular Movements: Extraocular movements intact.      Pupils: Pupils are equal, round, and reactive to light.      Comments: Binocular vision is 20/20 with correction.   Neck:      Thyroid: No thyromegaly.   Cardiovascular:      Rate and Rhythm: Normal rate and regular rhythm.      Heart sounds: No murmur heard.  Pulmonary:      Effort: Pulmonary effort is normal.      Breath sounds: Normal breath sounds.   Abdominal:      General: There is no distension.      Palpations: Abdomen is soft. There is no mass.      Tenderness: There is no abdominal tenderness.   Musculoskeletal:      Cervical back: Normal range of motion.   Skin:     Findings: No lesion or rash.   Neurological:      General: No focal deficit present.      Mental Status: He is oriented to person, place, and time.      Cranial Nerves: No cranial nerve deficit.   Psychiatric:         Mood and Affect: Mood normal.       The following data was reviewed by Marco Verdin MD on 09/20/2022.  Lab Results   Component Value Date    WBC 5.61 05/19/2022    HGB 16.1 05/19/2022    HCT 47.9 05/19/2022    MCV 95.0 05/19/2022     05/19/2022     Lab Results   Component Value Date    GLUCOSE 107 (H) 05/19/2022    BUN 15 05/19/2022    CREATININE 0.99 05/19/2022     05/19/2022    K 3.8 05/19/2022     05/19/2022    CO2 27.0 05/19/2022    CALCIUM  9.5 05/19/2022    PROTEINTOT 6.5 05/19/2022    ALBUMIN 4.20 05/19/2022    ALT 36 05/19/2022    AST 35 05/19/2022    ALKPHOS 63 05/19/2022    BILITOT 0.6 05/19/2022    EGFRIFNONA 66 10/26/2021    GLOB 2.3 05/19/2022    AGRATIO 1.8 05/19/2022    BCR 15.2 05/19/2022    ANIONGAP 10.0 05/19/2022      Lab Results   Component Value Date    CHOL 117 10/26/2021    CHLPL 130 11/23/2020    TRIG 98 10/26/2021    HDL 35 (L) 10/26/2021    LDL 63 10/26/2021     Lab Results   Component Value Date    TSH 2.150 05/19/2022     Lab Results   Component Value Date    HGBA1C 5.60 10/26/2021     Lab Results   Component Value Date    PSA 5.790 (H) 05/19/2022    PSA 5.620 (H) 10/26/2021    PSA 5.93 (H) 05/13/2021          HEALTH RISK ASSESSMENT    Smoking Status:  Social History     Tobacco Use   Smoking Status Never Smoker   Smokeless Tobacco Never Used     Alcohol Consumption:  Social History     Substance and Sexual Activity   Alcohol Use Yes   • Alcohol/week: 1.0 standard drink   • Types: 1 Cans of beer per week    Comment: maybe 1 beer a night      Fall Risk Screen:    RONNY Fall Risk Assessment was completed, and patient is at LOW risk for falls.Assessment completed on:5/19/2022    Depression Screening:  PHQ-2/PHQ-9 Depression Screening 5/19/2022   Retired PHQ-9 Total Score -   Retired Total Score -   Little Interest or Pleasure in Doing Things 0-->not at all   Feeling Down, Depressed or Hopeless 0-->not at all   PHQ-9: Brief Depression Severity Measure Score 0       Health Habits and Functional and Cognitive Screening:  Functional & Cognitive Status 9/20/2022   Do you have difficulty preparing food and eating? No   Do you have difficulty bathing yourself, getting dressed or grooming yourself? No   Do you have difficulty using the toilet? No   Do you have difficulty moving around from place to place? No   Do you have trouble with steps or getting out of a bed or a chair? No   Current Diet Well Balanced Diet   Dental Exam Not up to  date   Eye Exam Up to date   Exercise (times per week) 0 times per week   Current Exercises Include No Regular Exercise   Do you need help using the phone?  No   Are you deaf or do you have serious difficulty hearing?  No   Do you need help with transportation? No   Do you need help shopping? No   Do you need help preparing meals?  No   Do you need help with housework?  No   Do you need help with laundry? No   Do you need help taking your medications? No   Do you need help managing money? No   Do you ever drive or ride in a car without wearing a seat belt? No   Have you felt unusual stress, anger or loneliness in the last month? No   Who do you live with? Spouse   If you need help, do you have trouble finding someone available to you? No   Have you been bothered in the last four weeks by sexual problems? No   Do you have difficulty concentrating, remembering or making decisions? No       Age-appropriate Screening Schedule:  Refer to the list below for future screening recommendations based on patient's age, sex and/or medical conditions. Orders for these recommended tests are listed in the plan section. The patient has been provided with a written plan.    Health Maintenance   Topic Date Due   • ZOSTER VACCINE (1 of 2) Never done   • INFLUENZA VACCINE  10/01/2022   • LIPID PANEL  10/26/2022   • TDAP/TD VACCINES (2 - Tdap) 08/17/2032                       Assessment and Plan:       CMS Preventative Services Quick Reference  Risk Factors Identified During Encounter  Cardiovascular Disease  Immunizations Discussed/Encouraged (specific Immunizations; Influenza, Shingrix and COVID19  Obesity/Overweight     The above risks/problems have been discussed with the patient.  Follow up actions/plans if indicated are seen below in the Assessment/Plan Section.  Pertinent information has been shared with the patient in the After Visit Summary.    Today, we have reviewed his care.  With regard to the wellness exam, Magnus is  basically up-to-date on needed screenings and tests.  We will contact him in January for repeat Cologuard.  With regard to vaccines, he is mostly up-to-date.  We will move ahead with a flu vaccine today.  He declines COVID-19 booster.  I did recommend he consider the Shingrix vaccine.  He might want to check with his pharmacy about that.    We have also reviewed Magnus's other health problems.  He will be due for some repeat blood work in November.  I do want to recheck the PSA and also update some other labs.  The PSA has continued to be mildly elevated, but this probably represents an enlarged prostate.  His HDL cholesterol was somewhat below goal on most recent check.  We would like to see it above 40, and we will plan to recheck that in November as well.  Other labs as ordered.  He continues to use Ambien and says that it is of benefit to him.  We will continue it.  Risk of sleep behaviors discussed.    Diagnoses and all orders for this visit:    1. Physical exam (Primary)    2. Essential hypertension  -     Comprehensive Metabolic Panel; Future    3. Mixed hyperlipidemia  -     Comprehensive Metabolic Panel; Future  -     Lipid Panel; Future    4. Primary insomnia  -     zolpidem (AMBIEN) 5 MG tablet; Take 1 tablet by mouth Every Night.  Dispense: 90 tablet; Refill: 1    5. Other long term (current) drug therapy  -     zolpidem (AMBIEN) 5 MG tablet; Take 1 tablet by mouth Every Night.  Dispense: 90 tablet; Refill: 1  -     CBC (No Diff); Future  -     POC Urine Drug Screen [facility]    6. Elevated PSA  -     PSA DIAGNOSTIC; Future    7. Encounter for immunization  -     Fluzone High-Dose 65+yrs ([id number])        Follow Up:   Return in about 6 months (around 3/20/2023) for Recheck.     An After Visit Summary and PPPS were given to the patient.

## 2022-11-21 ENCOUNTER — TELEPHONE (OUTPATIENT)
Dept: FAMILY MEDICINE CLINIC | Age: 77
End: 2022-11-21

## 2022-11-22 ENCOUNTER — LAB (OUTPATIENT)
Dept: LAB | Facility: HOSPITAL | Age: 77
End: 2022-11-22

## 2022-11-22 DIAGNOSIS — I10 ESSENTIAL HYPERTENSION: ICD-10-CM

## 2022-11-22 DIAGNOSIS — Z79.899 OTHER LONG TERM (CURRENT) DRUG THERAPY: ICD-10-CM

## 2022-11-22 DIAGNOSIS — E78.2 MIXED HYPERLIPIDEMIA: ICD-10-CM

## 2022-11-22 DIAGNOSIS — R97.20 ELEVATED PSA: ICD-10-CM

## 2022-11-22 LAB
ALBUMIN SERPL-MCNC: 4.2 G/DL (ref 3.5–5.2)
ALBUMIN/GLOB SERPL: 1.9 G/DL
ALP SERPL-CCNC: 61 U/L (ref 39–117)
ALT SERPL W P-5'-P-CCNC: 32 U/L (ref 1–41)
ANION GAP SERPL CALCULATED.3IONS-SCNC: 6 MMOL/L (ref 5–15)
AST SERPL-CCNC: 27 U/L (ref 1–40)
BILIRUB SERPL-MCNC: 0.5 MG/DL (ref 0–1.2)
BUN SERPL-MCNC: 17 MG/DL (ref 8–23)
BUN/CREAT SERPL: 16.2 (ref 7–25)
CALCIUM SPEC-SCNC: 9.9 MG/DL (ref 8.6–10.5)
CHLORIDE SERPL-SCNC: 104 MMOL/L (ref 98–107)
CHOLEST SERPL-MCNC: 122 MG/DL (ref 0–200)
CO2 SERPL-SCNC: 30 MMOL/L (ref 22–29)
CREAT SERPL-MCNC: 1.05 MG/DL (ref 0.76–1.27)
DEPRECATED RDW RBC AUTO: 43.9 FL (ref 37–54)
EGFRCR SERPLBLD CKD-EPI 2021: 72.7 ML/MIN/1.73
ERYTHROCYTE [DISTWIDTH] IN BLOOD BY AUTOMATED COUNT: 12.1 % (ref 12.3–15.4)
GLOBULIN UR ELPH-MCNC: 2.2 GM/DL
GLUCOSE SERPL-MCNC: 110 MG/DL (ref 65–99)
HCT VFR BLD AUTO: 48.2 % (ref 37.5–51)
HDLC SERPL-MCNC: 39 MG/DL (ref 40–60)
HGB BLD-MCNC: 16 G/DL (ref 13–17.7)
LDLC SERPL CALC-MCNC: 68 MG/DL (ref 0–100)
LDLC/HDLC SERPL: 1.75 {RATIO}
MCH RBC QN AUTO: 32.3 PG (ref 26.6–33)
MCHC RBC AUTO-ENTMCNC: 33.2 G/DL (ref 31.5–35.7)
MCV RBC AUTO: 97.4 FL (ref 79–97)
PLATELET # BLD AUTO: 184 10*3/MM3 (ref 140–450)
PMV BLD AUTO: 10.7 FL (ref 6–12)
POTASSIUM SERPL-SCNC: 4.1 MMOL/L (ref 3.5–5.2)
PROT SERPL-MCNC: 6.4 G/DL (ref 6–8.5)
PSA SERPL-MCNC: 6.46 NG/ML (ref 0–4)
RBC # BLD AUTO: 4.95 10*6/MM3 (ref 4.14–5.8)
SODIUM SERPL-SCNC: 140 MMOL/L (ref 136–145)
TRIGL SERPL-MCNC: 74 MG/DL (ref 0–150)
VLDLC SERPL-MCNC: 15 MG/DL (ref 5–40)
WBC NRBC COR # BLD: 6.39 10*3/MM3 (ref 3.4–10.8)

## 2022-11-22 PROCEDURE — 80061 LIPID PANEL: CPT

## 2022-11-22 PROCEDURE — 84153 ASSAY OF PSA TOTAL: CPT

## 2022-11-22 PROCEDURE — 83036 HEMOGLOBIN GLYCOSYLATED A1C: CPT | Performed by: FAMILY MEDICINE

## 2022-11-22 PROCEDURE — 36415 COLL VENOUS BLD VENIPUNCTURE: CPT

## 2022-11-22 PROCEDURE — 80053 COMPREHEN METABOLIC PANEL: CPT

## 2022-11-22 PROCEDURE — 85027 COMPLETE CBC AUTOMATED: CPT

## 2022-11-23 LAB — HBA1C MFR BLD: 5.4 % (ref 4.8–5.6)

## 2023-01-12 ENCOUNTER — TELEPHONE (OUTPATIENT)
Dept: FAMILY MEDICINE CLINIC | Age: 78
End: 2023-01-12
Payer: MEDICARE

## 2023-01-12 DIAGNOSIS — Z12.11 SCREENING FOR COLON CANCER: Primary | ICD-10-CM

## 2023-01-12 NOTE — TELEPHONE ENCOUNTER
----- Message from Marco Verdin MD sent at 9/20/2022 12:43 PM EDT -----  Please arrange Cologuard.  Thanks

## 2023-02-02 DIAGNOSIS — Z79.899 OTHER LONG TERM (CURRENT) DRUG THERAPY: ICD-10-CM

## 2023-02-02 DIAGNOSIS — F51.01 PRIMARY INSOMNIA: ICD-10-CM

## 2023-02-02 RX ORDER — ZOLPIDEM TARTRATE 5 MG/1
5 TABLET ORAL NIGHTLY
Qty: 90 TABLET | Refills: 1 | Status: CANCELLED | OUTPATIENT
Start: 2023-02-02

## 2023-02-02 NOTE — TELEPHONE ENCOUNTER
Caller: Magnus Rousseau    Relationship: Self    Best call back number: [phone]    Requested Prescriptions:   Requested Prescriptions     Pending Prescriptions Disp Refills   • zolpidem (AMBIEN) 5 MG tablet 90 tablet 1     Sig: Take 1 tablet by mouth Every Night.        Pharmacy where request should be sent: [facility] [id number] [place], [place] - [address] [facility] - [phone]  - [phone] FX     Does the patient have less than a 3 day supply:  [x] Yes  [] No    Would you like a call back once the refill request has been completed: [x] Yes [] No    If the office needs to give you a call back, can they leave a voicemail: [x] Yes [] No    Angeles Dorsey Rep   02/02/23 11:38 EST

## 2023-03-21 ENCOUNTER — OFFICE VISIT (OUTPATIENT)
Dept: FAMILY MEDICINE CLINIC | Age: 78
End: 2023-03-21
Payer: MEDICARE

## 2023-03-21 ENCOUNTER — LAB (OUTPATIENT)
Dept: LAB | Facility: HOSPITAL | Age: 78
End: 2023-03-21
Payer: MEDICARE

## 2023-03-21 VITALS
HEART RATE: 100 BPM | BODY MASS INDEX: 32.5 KG/M2 | WEIGHT: 219.4 LBS | DIASTOLIC BLOOD PRESSURE: 90 MMHG | SYSTOLIC BLOOD PRESSURE: 139 MMHG | HEIGHT: 69 IN | TEMPERATURE: 97.9 F

## 2023-03-21 DIAGNOSIS — F51.01 PRIMARY INSOMNIA: Primary | ICD-10-CM

## 2023-03-21 DIAGNOSIS — R97.20 ELEVATED PSA: ICD-10-CM

## 2023-03-21 DIAGNOSIS — Z79.899 OTHER LONG TERM (CURRENT) DRUG THERAPY: ICD-10-CM

## 2023-03-21 DIAGNOSIS — E78.2 MIXED HYPERLIPIDEMIA: ICD-10-CM

## 2023-03-21 DIAGNOSIS — I10 ESSENTIAL HYPERTENSION: ICD-10-CM

## 2023-03-21 LAB — PSA SERPL-MCNC: 7.09 NG/ML (ref 0–4)

## 2023-03-21 PROCEDURE — 3080F DIAST BP >= 90 MM HG: CPT | Performed by: FAMILY MEDICINE

## 2023-03-21 PROCEDURE — 3075F SYST BP GE 130 - 139MM HG: CPT | Performed by: FAMILY MEDICINE

## 2023-03-21 PROCEDURE — 36415 COLL VENOUS BLD VENIPUNCTURE: CPT

## 2023-03-21 PROCEDURE — 1159F MED LIST DOCD IN RCRD: CPT | Performed by: FAMILY MEDICINE

## 2023-03-21 PROCEDURE — 99214 OFFICE O/P EST MOD 30 MIN: CPT | Performed by: FAMILY MEDICINE

## 2023-03-21 PROCEDURE — 84153 ASSAY OF PSA TOTAL: CPT

## 2023-03-21 PROCEDURE — 1160F RVW MEDS BY RX/DR IN RCRD: CPT | Performed by: FAMILY MEDICINE

## 2023-03-21 RX ORDER — ZOLPIDEM TARTRATE 10 MG/1
10 TABLET ORAL NIGHTLY
Qty: 90 TABLET | Refills: 1 | Status: SHIPPED | OUTPATIENT
Start: 2023-03-21

## 2023-03-21 RX ORDER — HYDROCHLOROTHIAZIDE 25 MG/1
25 TABLET ORAL DAILY
Qty: 90 TABLET | Refills: 3 | Status: SHIPPED | OUTPATIENT
Start: 2023-03-21

## 2023-03-21 RX ORDER — ROSUVASTATIN CALCIUM 20 MG/1
20 TABLET, COATED ORAL NIGHTLY
Qty: 90 TABLET | Refills: 3 | Status: SHIPPED | OUTPATIENT
Start: 2023-03-21

## 2023-03-21 NOTE — PROGRESS NOTES
Magnus Rousseau presents to [facility].    Chief Complaint:  Insomnia, blood pressure    Subjective       History of Present Illness:  Magnus is in today for follow-up on insomnia.  He has significant insomnia for which he has taken Ambien for a long period of time.  He does tolerate that medication well and continues to feel it is necessary.    He states that his sleep has not been as good recently.  He does tend to toss and turn some nights in particular.  He previously took the 10 mg dose, but we decreased his dose to 5 mg for safety concerns.  He denies any unusual sleep-related behaviors.  Dutch is reviewed.    He also has hypertension and elevated cholesterol for which he remains on treatments as noted below.  His blood pressure is mildly elevated today.  He does not check his blood pressure routinely at home.    Today he is also due for prostate check.  He was noted to have some elevation of the PSA on his most recent labs.    Review of Systems:  Review of Systems   Constitutional: Negative for chills and fever.   Respiratory: Negative for cough and shortness of breath.    Cardiovascular: Negative for chest pain and palpitations.   Gastrointestinal: Negative for abdominal pain, nausea and vomiting.        Objective   Medical History:  Past Medical History:   • Elevated prostate specific antigen (PSA)   • Essential hypertension   • Mixed hyperlipidemia   • Other long term (current) drug therapy   • Primary insomnia   • Unspecified atrial fibrillation (HCC)     Past Surgical History:   • APPENDECTOMY   • PACEMAKER IMPLANTATION   • RECTAL FISTULA CLOSURE WITH FIBRIN GLUE      Family History   Problem Relation Age of Onset   • Suicide Attempts Mother    • Coronary artery disease Father    • Cancer Brother         unspecified   • Heart attack Brother      Social History     Tobacco Use   • Smoking status: Never     Passive exposure: Past   • Smokeless tobacco: Never   Substance Use  "Topics   • Alcohol use: Yes     Alcohol/week: 1.0 standard drink     Types: 1 Cans of beer per week     Comment: maybe 1 beer a night        Health Maintenance Due   Topic Date Due   • ZOSTER VACCINE (1 of 2) Never done        Immunization History   Administered Date(s) Administered   • COVID-19 (MODERNA) 1st, 2nd, 3rd Dose Only 02/27/2021, 03/27/2021   • COVID-19 (MODERNA) BOOSTER 01/30/2022   • Flu Vaccine Intradermal Quad 18-64YR 09/21/2012, 10/01/2020   • Fluzone High Dose =>65 Years ([employer] ONLY) 10/11/2013, 11/06/2017   • Fluzone High-Dose 65+yrs 09/20/2022   • Pneumococcal Conjugate 13-Valent (PCV13) 06/27/2019   • Pneumococcal Polysaccharide (PPSV23) 03/05/2011   • Td 08/17/2022       No Known Allergies     Medications:  Current Outpatient Medications on File Prior to Visit   Medication Sig   • amLODIPine (NORVASC) 2.5 MG tablet Take 1 tablet by mouth Daily.   • warfarin (COUMADIN) 2.5 MG tablet Take 1 tablet by mouth Daily. 2.5 mg 5 days a week, M & Thursday 5 mg   • warfarin (COUMADIN) 5 MG tablet Take 1 tablet by mouth Daily. 2.5 mg 5 days a week, M & Thursday 5 mg   • [DISCONTINUED] hydroCHLOROthiazide (HYDRODIURIL) 25 MG tablet Take 1 tablet by mouth Daily.   • [DISCONTINUED] rosuvastatin (CRESTOR) 20 MG tablet Take 1 tablet by mouth Every Night.   • [DISCONTINUED] zolpidem (AMBIEN) 5 MG tablet Take 1 tablet by mouth Every Night.     No current facility-administered medications on file prior to visit.       Vital Signs:   Vitals:    03/21/23 1022 03/21/23 1055   BP: 143/90 139/90   BP Location: Left arm Left arm   Patient Position: Sitting Sitting   Pulse: 86 100   Temp: 97.9 °F (36.6 °C)    TempSrc: Oral    Weight: 99.5 kg (219 lb 6.4 oz)    Height: 174 cm (68.5\")    Body mass index is 32.87 kg/m².    Physical Exam:  Physical Exam  Vitals reviewed.   Constitutional:       General: He is not in acute distress.     Appearance: He is not ill-appearing.   Eyes:      Pupils: Pupils are equal, round, and " reactive to light.   Neck:      Comments: No thyromegaly  Cardiovascular:      Rate and Rhythm: Normal rate and regular rhythm.   Pulmonary:      Effort: Pulmonary effort is normal.      Breath sounds: Normal breath sounds.   Abdominal:      General: There is no distension.      Palpations: Abdomen is soft.      Tenderness: There is no abdominal tenderness.   Genitourinary:     Prostate: Enlarged. No nodules present.   Musculoskeletal:      Cervical back: Neck supple.   Lymphadenopathy:      Cervical: No cervical adenopathy.   Skin:     Findings: No lesion or rash.   Neurological:      Mental Status: He is alert.         Result Review      The following data was reviewed by Marco Verdin MD on 03/21/2023.  Lab Results   Component Value Date    WBC 6.39 11/22/2022    HGB 16.0 11/22/2022    HCT 48.2 11/22/2022    MCV 97.4 (H) 11/22/2022     11/22/2022     Lab Results   Component Value Date    GLUCOSE 110 (H) 11/22/2022    BUN 17 11/22/2022    CREATININE 1.05 11/22/2022     11/22/2022    K 4.1 11/22/2022     11/22/2022    CO2 30.0 (H) 11/22/2022    CALCIUM 9.9 11/22/2022    PROTEINTOT 6.4 11/22/2022    ALBUMIN 4.20 11/22/2022    ALT 32 11/22/2022    AST 27 11/22/2022    ALKPHOS 61 11/22/2022    BILITOT 0.5 11/22/2022    EGFR 72.7 11/22/2022    GLOB 2.2 11/22/2022    AGRATIO 1.9 11/22/2022    BCR 16.2 11/22/2022    ANIONGAP 6.0 11/22/2022      Lab Results   Component Value Date    CHOL 122 11/22/2022    CHLPL 130 11/23/2020    TRIG 74 11/22/2022    HDL 39 (L) 11/22/2022    LDL 68 11/22/2022     Lab Results   Component Value Date    TSH 2.150 05/19/2022     Lab Results   Component Value Date    HGBA1C 5.40 11/22/2022     Lab Results   Component Value Date    PSA 6.460 (H) 11/22/2022    PSA 5.790 (H) 05/19/2022    PSA 5.620 (H) 10/26/2021            Assessment and Plan:   Today, we have reviewed his care.  We will move ahead with an increased dose of zolpidem as noted.  Potential side effects  are discussed as well as the preference not to do the 10 mg dose.  However, the 5 mg dose does not seem to be effective for him.  Otherwise, we will recheck his blood pressure before he leaves.  His usual medications will be filled.  We will update a PSA as a precaution.  We will plan to see him back in about 6 months.       Diagnoses and all orders for this visit:    1. Primary insomnia (Primary)  -     zolpidem (AMBIEN) 10 MG tablet; Take 1 tablet by mouth Every Night.  Dispense: 90 tablet; Refill: 1    2. Other long term (current) drug therapy  -     zolpidem (AMBIEN) 10 MG tablet; Take 1 tablet by mouth Every Night.  Dispense: 90 tablet; Refill: 1    3. Essential hypertension  -     hydroCHLOROthiazide (HYDRODIURIL) 25 MG tablet; Take 1 tablet by mouth Daily.  Dispense: 90 tablet; Refill: 3    4. Mixed hyperlipidemia  -     rosuvastatin (CRESTOR) 20 MG tablet; Take 1 tablet by mouth Every Night.  Dispense: 90 tablet; Refill: 3    5. Elevated PSA  -     PSA DIAGNOSTIC; Future    Follow Up   Return in about 6 months (around 9/21/2023) for Recheck, Medicare Wellness.  Patient was given instructions and counseling regarding his condition or for health maintenance advice. Please see specific information pulled into the AVS if appropriate.

## 2023-05-02 DIAGNOSIS — F51.01 PRIMARY INSOMNIA: ICD-10-CM

## 2023-05-02 DIAGNOSIS — Z79.899 OTHER LONG TERM (CURRENT) DRUG THERAPY: ICD-10-CM

## 2023-05-02 RX ORDER — ZOLPIDEM TARTRATE 5 MG/1
TABLET ORAL
Qty: 90 TABLET | OUTPATIENT
Start: 2023-05-02

## 2023-05-04 DIAGNOSIS — F51.01 PRIMARY INSOMNIA: ICD-10-CM

## 2023-05-04 DIAGNOSIS — Z79.899 OTHER LONG TERM (CURRENT) DRUG THERAPY: ICD-10-CM

## 2023-05-04 RX ORDER — ZOLPIDEM TARTRATE 10 MG/1
10 TABLET ORAL NIGHTLY
Qty: 90 TABLET | Refills: 1 | Status: SHIPPED | OUTPATIENT
Start: 2023-05-04

## 2023-05-04 NOTE — TELEPHONE ENCOUNTER
Caller: Magnus Rousseau    Relationship: Self    Best call back number: [phone]    Requested Prescriptions:   Requested Prescriptions     Pending Prescriptions Disp Refills   • zolpidem (AMBIEN) 10 MG tablet 90 tablet 1     Sig: Take 1 tablet by mouth Every Night.        Pharmacy where request should be sent: [facility] [id number] [place], [place] - [address] [facility] - [phone]  - [phone] FX     Last office visit with prescribing clinician: 3/21/2023   Last telemedicine visit with prescribing clinician: 10/30/2023   Next office visit with prescribing clinician: 10/30/2023     Additional details provided by patient: PATIENT IS OUT OF THIS MEDICATION    Does the patient have less than a 3 day supply:  [x] Yes  [] No    Would you like a call back once the refill request has been completed: [] Yes [x] No    If the office needs to give you a call back, can they leave a voicemail: [] Yes [] No    Angeles Kemp Rep   05/04/23 14:33 EDT

## 2023-10-30 ENCOUNTER — LAB (OUTPATIENT)
Dept: LAB | Facility: HOSPITAL | Age: 78
End: 2023-10-30
Payer: MEDICARE

## 2023-10-30 ENCOUNTER — OFFICE VISIT (OUTPATIENT)
Dept: FAMILY MEDICINE CLINIC | Age: 78
End: 2023-10-30
Payer: MEDICARE

## 2023-10-30 VITALS
DIASTOLIC BLOOD PRESSURE: 74 MMHG | WEIGHT: 208.8 LBS | HEART RATE: 91 BPM | BODY MASS INDEX: 30.93 KG/M2 | TEMPERATURE: 98 F | SYSTOLIC BLOOD PRESSURE: 117 MMHG | HEIGHT: 69 IN

## 2023-10-30 DIAGNOSIS — I48.91 ATRIAL FIBRILLATION, UNSPECIFIED TYPE: ICD-10-CM

## 2023-10-30 DIAGNOSIS — F51.01 PRIMARY INSOMNIA: ICD-10-CM

## 2023-10-30 DIAGNOSIS — Z79.899 OTHER LONG TERM (CURRENT) DRUG THERAPY: ICD-10-CM

## 2023-10-30 DIAGNOSIS — Z23 ENCOUNTER FOR IMMUNIZATION: ICD-10-CM

## 2023-10-30 DIAGNOSIS — Z00.00 PHYSICAL EXAM: Primary | ICD-10-CM

## 2023-10-30 DIAGNOSIS — R97.20 ELEVATED PSA: ICD-10-CM

## 2023-10-30 DIAGNOSIS — I10 ESSENTIAL HYPERTENSION: ICD-10-CM

## 2023-10-30 DIAGNOSIS — R73.9 HYPERGLYCEMIA: ICD-10-CM

## 2023-10-30 DIAGNOSIS — E78.2 MIXED HYPERLIPIDEMIA: ICD-10-CM

## 2023-10-30 LAB
ALBUMIN SERPL-MCNC: 4 G/DL (ref 3.5–5.2)
ALBUMIN/GLOB SERPL: 1.6 G/DL
ALP SERPL-CCNC: 55 U/L (ref 39–117)
ALT SERPL W P-5'-P-CCNC: 55 U/L (ref 1–41)
AMPHET+METHAMPHET UR QL: NEGATIVE
AMPHETAMINES UR QL: NEGATIVE
ANION GAP SERPL CALCULATED.3IONS-SCNC: 7.1 MMOL/L (ref 5–15)
AST SERPL-CCNC: 35 U/L (ref 1–40)
BARBITURATES UR QL SCN: NEGATIVE
BENZODIAZ UR QL SCN: NEGATIVE
BILIRUB SERPL-MCNC: 0.7 MG/DL (ref 0–1.2)
BUN SERPL-MCNC: 19 MG/DL (ref 8–23)
BUN/CREAT SERPL: 16.2 (ref 7–25)
BUPRENORPHINE SERPL-MCNC: NEGATIVE NG/ML
CALCIUM SPEC-SCNC: 9.9 MG/DL (ref 8.6–10.5)
CANNABINOIDS SERPL QL: NEGATIVE
CHLORIDE SERPL-SCNC: 103 MMOL/L (ref 98–107)
CHOLEST SERPL-MCNC: 130 MG/DL (ref 0–200)
CO2 SERPL-SCNC: 30.9 MMOL/L (ref 22–29)
COCAINE UR QL: NEGATIVE
CREAT SERPL-MCNC: 1.17 MG/DL (ref 0.76–1.27)
DEPRECATED RDW RBC AUTO: 45.4 FL (ref 37–54)
EGFRCR SERPLBLD CKD-EPI 2021: 63.4 ML/MIN/1.73
ERYTHROCYTE [DISTWIDTH] IN BLOOD BY AUTOMATED COUNT: 12.4 % (ref 12.3–15.4)
EXPIRATION DATE: NORMAL
FOLATE SERPL-MCNC: 11.5 NG/ML (ref 4.78–24.2)
GLOBULIN UR ELPH-MCNC: 2.5 GM/DL
GLUCOSE SERPL-MCNC: 107 MG/DL (ref 65–99)
HCT VFR BLD AUTO: 49.7 % (ref 37.5–51)
HDLC SERPL-MCNC: 43 MG/DL (ref 40–60)
HGB BLD-MCNC: 16.6 G/DL (ref 13–17.7)
LDLC SERPL CALC-MCNC: 68 MG/DL (ref 0–100)
LDLC/HDLC SERPL: 1.56 {RATIO}
Lab: NORMAL
MCH RBC QN AUTO: 32.4 PG (ref 26.6–33)
MCHC RBC AUTO-ENTMCNC: 33.4 G/DL (ref 31.5–35.7)
MCV RBC AUTO: 97.1 FL (ref 79–97)
MDMA UR QL SCN: NEGATIVE
METHADONE UR QL SCN: NEGATIVE
OPIATES UR QL: NEGATIVE
OXYCODONE UR QL SCN: NEGATIVE
PCP UR QL SCN: NEGATIVE
PLATELET # BLD AUTO: 162 10*3/MM3 (ref 140–450)
PMV BLD AUTO: 9.7 FL (ref 6–12)
POTASSIUM SERPL-SCNC: 3.8 MMOL/L (ref 3.5–5.2)
PROT SERPL-MCNC: 6.5 G/DL (ref 6–8.5)
PSA SERPL-MCNC: 7.29 NG/ML (ref 0–4)
RBC # BLD AUTO: 5.12 10*6/MM3 (ref 4.14–5.8)
SODIUM SERPL-SCNC: 141 MMOL/L (ref 136–145)
TRIGL SERPL-MCNC: 100 MG/DL (ref 0–150)
TSH SERPL DL<=0.05 MIU/L-ACNC: 2.26 UIU/ML (ref 0.27–4.2)
VIT B12 BLD-MCNC: 208 PG/ML (ref 211–946)
VLDLC SERPL-MCNC: 19 MG/DL (ref 5–40)
WBC NRBC COR # BLD: 7.32 10*3/MM3 (ref 3.4–10.8)

## 2023-10-30 PROCEDURE — 80053 COMPREHEN METABOLIC PANEL: CPT

## 2023-10-30 PROCEDURE — 36415 COLL VENOUS BLD VENIPUNCTURE: CPT

## 2023-10-30 PROCEDURE — 82607 VITAMIN B-12: CPT

## 2023-10-30 PROCEDURE — 83036 HEMOGLOBIN GLYCOSYLATED A1C: CPT | Performed by: FAMILY MEDICINE

## 2023-10-30 PROCEDURE — 84153 ASSAY OF PSA TOTAL: CPT

## 2023-10-30 PROCEDURE — 80061 LIPID PANEL: CPT

## 2023-10-30 PROCEDURE — 84443 ASSAY THYROID STIM HORMONE: CPT

## 2023-10-30 PROCEDURE — 82746 ASSAY OF FOLIC ACID SERUM: CPT

## 2023-10-30 PROCEDURE — 85027 COMPLETE CBC AUTOMATED: CPT

## 2023-10-30 RX ORDER — AMLODIPINE BESYLATE 5 MG/1
5 TABLET ORAL DAILY
COMMUNITY
Start: 2023-10-25 | End: 2024-10-19

## 2023-10-30 RX ORDER — ZOLPIDEM TARTRATE 10 MG/1
10 TABLET ORAL NIGHTLY
Qty: 90 TABLET | Refills: 1 | Status: SHIPPED | OUTPATIENT
Start: 2023-10-30

## 2023-10-30 RX ORDER — ROSUVASTATIN CALCIUM 20 MG/1
20 TABLET, COATED ORAL NIGHTLY
Qty: 90 TABLET | Refills: 3 | Status: SHIPPED | OUTPATIENT
Start: 2023-10-30

## 2023-10-30 RX ORDER — HYDROCHLOROTHIAZIDE 25 MG/1
25 TABLET ORAL DAILY
Qty: 90 TABLET | Refills: 3 | Status: SHIPPED | OUTPATIENT
Start: 2023-10-30

## 2023-10-30 NOTE — PROGRESS NOTES
The ABCs of the Annual Wellness Visit  Subsequent Medicare Wellness Visit    Subjective    Magnus Rousseau is a 78 y.o. male who presents for a Subsequent Medicare Wellness Visit.    The following portions of the patient's history were reviewed and updated as appropriate: allergies, current medications, past family history, past medical history, past social history, past surgical history, and problem list.    Compared to one year ago, the patient feels his physical health is the same.    Compared to one year ago, the patient feels his mental health is the same.    Recent Hospitalizations:  He was not admitted to the hospital during the last year.     Current Medical Providers:  Patient Care Team:  Marco Verdin MD as PCP - General (Family Medicine)  John Dumont DO as Consulting Physician (Cardiac Electrophysiology)    Outpatient Medications Prior to Visit   Medication Sig Dispense Refill    amLODIPine (NORVASC) 5 MG tablet Take 1 tablet by mouth Daily.      warfarin (COUMADIN) 2.5 MG tablet Take 1 tablet by mouth Daily. 5MG ON FRIDAY 2.5 REST      warfarin (COUMADIN) 5 MG tablet Take 1 tablet by mouth Daily. 5MG ON FRIDAY 2.5 REST      hydroCHLOROthiazide (HYDRODIURIL) 25 MG tablet Take 1 tablet by mouth Daily. 90 tablet 3    rosuvastatin (CRESTOR) 20 MG tablet Take 1 tablet by mouth Every Night. 90 tablet 3    zolpidem (AMBIEN) 10 MG tablet Take 1 tablet by mouth Every Night. 90 tablet 1    amLODIPine (NORVASC) 2.5 MG tablet Take 1 tablet by mouth Daily.       No facility-administered medications prior to visit.       No opioid medication identified on active medication list. I have reviewed chart for other potential  high risk medication/s and harmful drug interactions in the elderly.      Aspirin is not on active medication list.  Aspirin use is not indicated based on review of current medical condition/s. Risk of harm outweighs potential benefits.  His wife, Miranda, would make decisions if  "needed.    Patient Active Problem List   Diagnosis    Primary insomnia    Essential hypertension    Mixed hyperlipidemia    Atrial fibrillation    Other long term (current) drug therapy     Advance Care Planning  Advance Directive is on file.  ACP discussion was held with the patient during this visit. Patient has an advance directive in EMR which is still valid.      Objective    Vitals:    10/30/23 0941   BP: 141/84   BP Location: Right arm   Patient Position: Sitting   Pulse: 85   Temp: 98 °F (36.7 °C)   TempSrc: Oral   Weight: 94.7 kg (208 lb 12.8 oz)   Height: 174 cm (68.5\")   PainSc: 0-No pain     Estimated body mass index is 31.28 kg/m² as calculated from the following:    Height as of this encounter: 174 cm (68.5\").    Weight as of this encounter: 94.7 kg (208 lb 12.8 oz).    BMI is >= 30 and <35. (Class 1 Obesity). The following options were offered after discussion;: exercise counseling/recommendations and nutrition counseling/recommendations    Does the patient have evidence of cognitive impairment? No.        HEALTH RISK ASSESSMENT    Smoking Status:  Social History     Tobacco Use   Smoking Status Never    Passive exposure: Past   Smokeless Tobacco Never     Alcohol Consumption:  Social History     Substance and Sexual Activity   Alcohol Use Yes    Alcohol/week: 1.0 standard drink of alcohol    Types: 1 Cans of beer per week    Comment: maybe 1 beer a night      Fall Risk Screen:    RONNY Fall Risk Assessment was completed, and patient is at LOW risk for falls.Assessment completed on:10/30/2023    Depression Screening:      10/30/2023     9:39 AM   PHQ-2/PHQ-9 Depression Screening   Little Interest or Pleasure in Doing Things 0-->not at all   Feeling Down, Depressed or Hopeless 0-->not at all   PHQ-9: Brief Depression Severity Measure Score 0       Health Habits and Functional and Cognitive Screening:      10/30/2023     9:40 AM   Functional & Cognitive Status   Do you have difficulty preparing food " and eating? No   Do you have difficulty bathing yourself, getting dressed or grooming yourself? No   Do you have difficulty using the toilet? No   Do you have difficulty moving around from place to place? No   Do you have trouble with steps or getting out of a bed or a chair? No   Current Diet Well Balanced Diet   Dental Exam Not up to date   Eye Exam Not up to date   Exercise (times per week) 0 times per week   Current Exercises Include No Regular Exercise   Do you need help using the phone?  No   Are you deaf or do you have serious difficulty hearing?  No   Do you need help to go to places out of walking distance? No   Do you need help shopping? No   Do you need help preparing meals?  No   Do you need help with housework?  No   Do you need help with laundry? No   Do you need help taking your medications? No   Do you need help managing money? No   Do you ever drive or ride in a car without wearing a seat belt? No   Have you felt unusual stress, anger or loneliness in the last month? No   Who do you live with? Spouse   If you need help, do you have trouble finding someone available to you? No   Have you been bothered in the last four weeks by sexual problems? No   Do you have difficulty concentrating, remembering or making decisions? No       Age-appropriate Screening Schedule:  Refer to the list below for future screening recommendations based on patient's age, sex and/or medical conditions. Orders for these recommended tests are listed in the plan section. The patient has been provided with a written plan.    Health Maintenance   Topic Date Due    ZOSTER VACCINE (1 of 2) Never done    INFLUENZA VACCINE  08/01/2023    BMI FOLLOWUP  09/20/2023    COVID-19 Vaccine (4 - 2023-24 season) 10/29/2024 (Originally 9/1/2023)    LIPID PANEL  11/22/2023    ANNUAL WELLNESS VISIT  10/30/2024    COLORECTAL CANCER SCREENING  02/06/2026    TDAP/TD VACCINES (2 - Tdap) 08/17/2032    HEPATITIS C SCREENING  Completed    Pneumococcal  Vaccine 65+  Completed          CMS Preventative Services Quick Reference  Risk Factors Identified During Encounter  Immunizations Discussed/Encouraged: Sneha  The above risks/problems have been discussed with the patient.  Pertinent information has been shared with the patient in the After Visit Summary.  An After Visit Summary and PPPS were made available to the patient.    Follow Up:   Next Medicare Wellness visit to be scheduled in 1 year.     Additional E&M Note during same encounter follows:  Patient has multiple medical problems which are significant and separately identifiable that require additional work above and beyond the Medicare Wellness Visit.      Chief Complaint:  Blood pressure, cholesterol, insomnia    Subjective    History of Present Illness:  In addition to the Medicare wellness exam, Magnus is also here for follow-up on his usual care.  He has hypertension and elevated cholesterol for which he remains on treatments as noted.  His blood pressure is just above goal on initial check.  He states that he saw cardiology last week and they changed his amlodipine to 5 mg daily.  He is unaware of obvious side effects from the medications that he takes.    He has significant insomnia for which he has taken Ambien for a long period of time.  He tolerate this well and continues to feel it is necessary.   Magnus had some increased difficulty with sleep after going to 5 mg of zolpidem.  He was changed back to the 10 mg dose, and he says he is doing better.  He denies any unusual sleep-related behaviors.  Dutch is reviewed.     Review of Systems:  Review of Systems   Constitutional:  Negative for chills and fever.   Respiratory:  Negative for cough and shortness of breath.    Cardiovascular:  Negative for chest pain and palpitations.   Gastrointestinal:  Negative for abdominal pain, nausea and vomiting.      Objective   Vital Signs:  Vitals:    10/30/23 0941 10/30/23 1006   BP: 141/84 117/74   BP Location:  "Right arm Right arm   Patient Position: Sitting Sitting   Pulse: 85 91   Temp: 98 °F (36.7 °C)    TempSrc: Oral    Weight: 94.7 kg (208 lb 12.8 oz)    Height: 174 cm (68.5\")    PainSc: 0-No pain    Body mass index is 31.28 kg/m².    Physical Exam  Vitals and nursing note reviewed.   Constitutional:       General: He is not in acute distress.     Appearance: He is obese. He is not ill-appearing.   HENT:      Right Ear: Tympanic membrane and ear canal normal.      Left Ear: Tympanic membrane and ear canal normal.      Ears:      Comments: Hearing is normal with forced whisper bilaterally.     Mouth/Throat:      Mouth: Mucous membranes are moist.      Comments: Pharynx appears normal  Eyes:      Extraocular Movements: Extraocular movements intact.      Pupils: Pupils are equal, round, and reactive to light.      Comments: Binocular vision is 20/20 with correction.   Neck:      Thyroid: No thyromegaly.   Cardiovascular:      Rate and Rhythm: Normal rate and regular rhythm.      Heart sounds: No murmur heard.  Pulmonary:      Effort: Pulmonary effort is normal.      Breath sounds: Normal breath sounds.   Abdominal:      General: There is no distension.      Palpations: Abdomen is soft. There is no mass.      Tenderness: There is no abdominal tenderness.   Musculoskeletal:      Cervical back: Normal range of motion.   Skin:     Findings: No lesion or rash.   Neurological:      General: No focal deficit present.      Mental Status: He is oriented to person, place, and time.      Cranial Nerves: No cranial nerve deficit.   Psychiatric:         Mood and Affect: Mood normal.       The following data was reviewed by Marco Verdin MD on 10/30/2023.  Lab Results   Component Value Date    WBC 6.39 11/22/2022    HGB 16.0 11/22/2022    HCT 48.2 11/22/2022    MCV 97.4 (H) 11/22/2022     11/22/2022     Lab Results   Component Value Date    GLUCOSE 110 (H) 11/22/2022    BUN 17 11/22/2022    CREATININE 1.05 11/22/2022 "     11/22/2022    K 4.1 11/22/2022     11/22/2022    CO2 30.0 (H) 11/22/2022    CALCIUM 9.9 11/22/2022    PROTEINTOT 6.4 11/22/2022    ALBUMIN 4.20 11/22/2022    ALT 32 11/22/2022    AST 27 11/22/2022    ALKPHOS 61 11/22/2022    BILITOT 0.5 11/22/2022    EGFR 72.7 11/22/2022    GLOB 2.2 11/22/2022    AGRATIO 1.9 11/22/2022    BCR 16.2 11/22/2022    ANIONGAP 6.0 11/22/2022      Lab Results   Component Value Date    CHOL 122 11/22/2022    CHLPL 130 11/23/2020    TRIG 74 11/22/2022    HDL 39 (L) 11/22/2022    LDL 68 11/22/2022     Lab Results   Component Value Date    TSH 2.150 05/19/2022     Lab Results   Component Value Date    HGBA1C 5.40 11/22/2022     Lab Results   Component Value Date    PSA 7.090 (H) 03/21/2023    PSA 6.460 (H) 11/22/2022    PSA 5.790 (H) 05/19/2022             Assessment and Plan:   Today, we have reviewed his care.  Magnus seems to be doing well overall.  Regarding the Medicare wellness exam, he is basically up-to-date on recommended cancer screenings.  PSA will be rechecked today as a precaution.  We will move ahead with flu shot.  He declines COVID-19 booster though.    Regarding his usual care, we will recheck his blood pressure before he leaves.  Cardiology did make a recent adjustment with medication though.  Otherwise, we will update labs and refill medications as noted including Ambien which continues to be of benefit for him.  Tentatively, we will plan to see him back in about 6 months.    Diagnoses and all orders for this visit:    1. Physical exam (Primary)    2. Essential hypertension  -     Comprehensive Metabolic Panel; Future  -     hydroCHLOROthiazide (HYDRODIURIL) 25 MG tablet; Take 1 tablet by mouth Daily.  Dispense: 90 tablet; Refill: 3    3. Mixed hyperlipidemia  -     Comprehensive Metabolic Panel; Future  -     Lipid Panel; Future  -     TSH; Future  -     rosuvastatin (CRESTOR) 20 MG tablet; Take 1 tablet by mouth Every Night.  Dispense: 90 tablet; Refill:  3    4. Other long term (current) drug therapy  -     POC Urine Drug Screen [facility]  -     CBC (No Diff); Future  -     zolpidem (AMBIEN) 10 MG tablet; Take 1 tablet by mouth Every Night.  Dispense: 90 tablet; Refill: 1  -     Vitamin B12 & Folate; Future    5. Primary insomnia  -     zolpidem (AMBIEN) 10 MG tablet; Take 1 tablet by mouth Every Night.  Dispense: 90 tablet; Refill: 1    6. Atrial fibrillation, unspecified type  -     CBC (No Diff); Future    7. Elevated PSA  -     PSA DIAGNOSTIC; Future    8. Encounter for immunization  -     Fluzone High-Dose 65+yrs ([id number])       Follow Up  Return in about 6 months (around 4/30/2024) for Recheck.  Patient was given instructions and counseling regarding his condition or for health maintenance advice. Please see specific information pulled into the AVS if appropriate.

## 2023-10-31 LAB — HBA1C MFR BLD: 5.8 % (ref 4.8–5.6)

## 2024-02-01 ENCOUNTER — DOCUMENTATION (OUTPATIENT)
Dept: FAMILY MEDICINE CLINIC | Age: 79
End: 2024-02-01
Payer: MEDICARE

## 2024-02-01 ENCOUNTER — TELEPHONE (OUTPATIENT)
Dept: FAMILY MEDICINE CLINIC | Age: 79
End: 2024-02-01
Payer: MEDICARE

## 2024-02-01 DIAGNOSIS — E53.8 B12 DEFICIENCY: ICD-10-CM

## 2024-02-01 DIAGNOSIS — R97.20 ELEVATED PSA: Primary | ICD-10-CM

## 2024-02-01 NOTE — TELEPHONE ENCOUNTER
----- Message from Amber Snell LPN sent at 10/31/2023  8:18 AM EDT -----   TICKLE for PSA diagnostic and B12 level in 90 days for elevated PSA and B12 deficiency respectively.

## 2024-02-02 ENCOUNTER — LAB (OUTPATIENT)
Dept: LAB | Facility: HOSPITAL | Age: 79
End: 2024-02-02
Payer: MEDICARE

## 2024-02-02 DIAGNOSIS — E53.8 B12 DEFICIENCY: ICD-10-CM

## 2024-02-02 DIAGNOSIS — R97.20 ELEVATED PSA: ICD-10-CM

## 2024-02-02 LAB
PSA SERPL-MCNC: 6.45 NG/ML (ref 0–4)
VIT B12 BLD-MCNC: 362 PG/ML (ref 211–946)

## 2024-02-02 PROCEDURE — 84153 ASSAY OF PSA TOTAL: CPT

## 2024-02-02 PROCEDURE — 36415 COLL VENOUS BLD VENIPUNCTURE: CPT

## 2024-02-02 PROCEDURE — 82607 VITAMIN B-12: CPT

## 2024-04-30 ENCOUNTER — LAB (OUTPATIENT)
Dept: LAB | Facility: HOSPITAL | Age: 79
End: 2024-04-30
Payer: MEDICARE

## 2024-04-30 ENCOUNTER — OFFICE VISIT (OUTPATIENT)
Dept: FAMILY MEDICINE CLINIC | Age: 79
End: 2024-04-30
Payer: MEDICARE

## 2024-04-30 VITALS
BODY MASS INDEX: 31.04 KG/M2 | HEART RATE: 86 BPM | DIASTOLIC BLOOD PRESSURE: 75 MMHG | SYSTOLIC BLOOD PRESSURE: 126 MMHG | OXYGEN SATURATION: 94 % | HEIGHT: 69 IN | TEMPERATURE: 98 F | WEIGHT: 209.6 LBS

## 2024-04-30 DIAGNOSIS — Z79.899 OTHER LONG TERM (CURRENT) DRUG THERAPY: ICD-10-CM

## 2024-04-30 DIAGNOSIS — F51.01 PRIMARY INSOMNIA: Primary | ICD-10-CM

## 2024-04-30 DIAGNOSIS — R30.0 DYSURIA: ICD-10-CM

## 2024-04-30 DIAGNOSIS — R73.9 HYPERGLYCEMIA: ICD-10-CM

## 2024-04-30 DIAGNOSIS — I10 ESSENTIAL HYPERTENSION: ICD-10-CM

## 2024-04-30 DIAGNOSIS — E78.2 MIXED HYPERLIPIDEMIA: ICD-10-CM

## 2024-04-30 LAB
ALBUMIN SERPL-MCNC: 4.1 G/DL (ref 3.5–5.2)
ALBUMIN/GLOB SERPL: 1.5 G/DL
ALP SERPL-CCNC: 83 U/L (ref 39–117)
ALT SERPL W P-5'-P-CCNC: 25 U/L (ref 1–41)
ANION GAP SERPL CALCULATED.3IONS-SCNC: 8.4 MMOL/L (ref 5–15)
AST SERPL-CCNC: 28 U/L (ref 1–40)
BACTERIA UR QL AUTO: ABNORMAL /HPF
BILIRUB SERPL-MCNC: 0.5 MG/DL (ref 0–1.2)
BILIRUB UR QL STRIP: NEGATIVE
BUN SERPL-MCNC: 12 MG/DL (ref 8–23)
BUN/CREAT SERPL: 12.1 (ref 7–25)
CALCIUM SPEC-SCNC: 9.9 MG/DL (ref 8.6–10.5)
CHLORIDE SERPL-SCNC: 102 MMOL/L (ref 98–107)
CLARITY UR: ABNORMAL
CO2 SERPL-SCNC: 29.6 MMOL/L (ref 22–29)
COLOR UR: YELLOW
CREAT SERPL-MCNC: 0.99 MG/DL (ref 0.76–1.27)
DEPRECATED RDW RBC AUTO: 43.2 FL (ref 37–54)
EGFRCR SERPLBLD CKD-EPI 2021: 77 ML/MIN/1.73
ERYTHROCYTE [DISTWIDTH] IN BLOOD BY AUTOMATED COUNT: 12 % (ref 12.3–15.4)
GLOBULIN UR ELPH-MCNC: 2.8 GM/DL
GLUCOSE SERPL-MCNC: 105 MG/DL (ref 65–99)
GLUCOSE UR STRIP-MCNC: NEGATIVE MG/DL
HCT VFR BLD AUTO: 47.9 % (ref 37.5–51)
HGB BLD-MCNC: 16 G/DL (ref 13–17.7)
HGB UR QL STRIP.AUTO: ABNORMAL
KETONES UR QL STRIP: NEGATIVE
LEUKOCYTE ESTERASE UR QL STRIP.AUTO: ABNORMAL
MCH RBC QN AUTO: 32.1 PG (ref 26.6–33)
MCHC RBC AUTO-ENTMCNC: 33.4 G/DL (ref 31.5–35.7)
MCV RBC AUTO: 96.2 FL (ref 79–97)
NITRITE UR QL STRIP: NEGATIVE
PH UR STRIP.AUTO: 7 [PH] (ref 5–8)
PLATELET # BLD AUTO: 247 10*3/MM3 (ref 140–450)
PMV BLD AUTO: 9.6 FL (ref 6–12)
POTASSIUM SERPL-SCNC: 4.1 MMOL/L (ref 3.5–5.2)
PROT SERPL-MCNC: 6.9 G/DL (ref 6–8.5)
PROT UR QL STRIP: NEGATIVE
RBC # BLD AUTO: 4.98 10*6/MM3 (ref 4.14–5.8)
RBC # UR STRIP: ABNORMAL /HPF
REF LAB TEST METHOD: ABNORMAL
SODIUM SERPL-SCNC: 140 MMOL/L (ref 136–145)
SP GR UR STRIP: 1.02 (ref 1–1.03)
SQUAMOUS #/AREA URNS HPF: ABNORMAL /HPF
UROBILINOGEN UR QL STRIP: ABNORMAL
WBC # UR STRIP: ABNORMAL /HPF
WBC NRBC COR # BLD AUTO: 6.72 10*3/MM3 (ref 3.4–10.8)

## 2024-04-30 PROCEDURE — 87147 CULTURE TYPE IMMUNOLOGIC: CPT | Performed by: FAMILY MEDICINE

## 2024-04-30 PROCEDURE — 81001 URINALYSIS AUTO W/SCOPE: CPT

## 2024-04-30 PROCEDURE — 3074F SYST BP LT 130 MM HG: CPT | Performed by: FAMILY MEDICINE

## 2024-04-30 PROCEDURE — 99214 OFFICE O/P EST MOD 30 MIN: CPT | Performed by: FAMILY MEDICINE

## 2024-04-30 PROCEDURE — 36415 COLL VENOUS BLD VENIPUNCTURE: CPT

## 2024-04-30 PROCEDURE — 85027 COMPLETE CBC AUTOMATED: CPT

## 2024-04-30 PROCEDURE — G2211 COMPLEX E/M VISIT ADD ON: HCPCS | Performed by: FAMILY MEDICINE

## 2024-04-30 PROCEDURE — 83036 HEMOGLOBIN GLYCOSYLATED A1C: CPT

## 2024-04-30 PROCEDURE — 1159F MED LIST DOCD IN RCRD: CPT | Performed by: FAMILY MEDICINE

## 2024-04-30 PROCEDURE — 1160F RVW MEDS BY RX/DR IN RCRD: CPT | Performed by: FAMILY MEDICINE

## 2024-04-30 PROCEDURE — 3078F DIAST BP <80 MM HG: CPT | Performed by: FAMILY MEDICINE

## 2024-04-30 PROCEDURE — 80053 COMPREHEN METABOLIC PANEL: CPT

## 2024-04-30 PROCEDURE — 87086 URINE CULTURE/COLONY COUNT: CPT | Performed by: FAMILY MEDICINE

## 2024-04-30 RX ORDER — ZOLPIDEM TARTRATE 10 MG/1
10 TABLET ORAL NIGHTLY
Qty: 90 TABLET | Refills: 1 | Status: SHIPPED | OUTPATIENT
Start: 2024-04-30

## 2024-04-30 RX ORDER — HYDROCHLOROTHIAZIDE 25 MG/1
25 TABLET ORAL DAILY
Qty: 90 TABLET | Refills: 3 | Status: SHIPPED | OUTPATIENT
Start: 2024-04-30

## 2024-04-30 RX ORDER — ROSUVASTATIN CALCIUM 20 MG/1
20 TABLET, COATED ORAL NIGHTLY
Qty: 90 TABLET | Refills: 3 | Status: SHIPPED | OUTPATIENT
Start: 2024-04-30

## 2024-04-30 NOTE — PROGRESS NOTES
Magnus Rousseau presents to [facility].    Chief Complaint:  Insomnia, blood pressure, cholesterol    Subjective   History of Present Illness:  Magnus is being seen today for follow-up on his care.  He has significant insomnia for which he takes Ambien on a regular basis..  He tolerates the medication well and continues to feel it is necessary.   We previously decreased the dose to 5 mg nightly, but it was not helpful for him.  He denies any unusual sleep-related behaviors.  Dutch is reviewed.     He also has hypertension and elevated cholesterol.  He remains on treatment for these issues.  His blood pressure is well-controlled today.  His blood pressure is usually in a good range at home as well.  He tolerates the medications well.    Review of Systems:  Review of Systems   Constitutional:  Negative for chills and fever.   Respiratory:  Negative for cough and shortness of breath.    Cardiovascular:  Negative for chest pain and palpitations.   Gastrointestinal:  Negative for abdominal pain, nausea and vomiting.      Objective   Medical History:  Past Medical History:    Elevated prostate specific antigen (PSA)    Essential hypertension    Mixed hyperlipidemia    Other long term (current) drug therapy    Primary insomnia    Unspecified atrial fibrillation     Past Surgical History:    APPENDECTOMY    PACEMAKER IMPLANTATION    RECTAL FISTULA CLOSURE WITH FIBRIN GLUE      Family History   Problem Relation Age of Onset    Suicide Attempts Mother     Coronary artery disease Father     Cancer Brother         unspecified    Heart attack Brother      Social History     Tobacco Use    Smoking status: Never     Passive exposure: Past    Smokeless tobacco: Never   Substance Use Topics    Alcohol use: Yes     Alcohol/week: 1.0 standard drink of alcohol     Types: 1 Cans of beer per week     Comment: maybe 1 beer a night        Health Maintenance Due   Topic Date Due    ZOSTER VACCINE (1 of 2) Never  "done    RSV Vaccine - Adults (1 - 1-dose 60+ series) Never done        Immunization History   Administered Date(s) Administered    COVID-19 (MODERNA) 1st,2nd,3rd Dose Monovalent 02/27/2021, 03/27/2021    COVID-19 (MODERNA) Monovalent Original Booster 01/30/2022    Flu Vaccine Intradermal Quad 18-64YR 09/21/2012, 10/01/2020    Fluzone High Dose =>65 Years ([employer] ONLY) 10/11/2013, 11/06/2017    Fluzone High-Dose 65+yrs 09/20/2022, 10/30/2023    Influenza Seasonal Injectable 09/21/2012    Pneumococcal Conjugate 13-Valent (PCV13) 06/27/2019    Pneumococcal Polysaccharide (PPSV23) 03/05/2011    Td (TDVAX) 08/17/2022       No Known Allergies     Medications:  Current Outpatient Medications on File Prior to Visit   Medication Sig    amLODIPine (NORVASC) 5 MG tablet Take 1 tablet by mouth Daily.    warfarin (COUMADIN) 2.5 MG tablet Take 1 tablet by mouth Daily. 5MG ON FRIDAY 2.5 REST    warfarin (COUMADIN) 5 MG tablet Take 1 tablet by mouth Daily. 5MG ON FRIDAY 2.5 REST    [DISCONTINUED] hydroCHLOROthiazide (HYDRODIURIL) 25 MG tablet Take 1 tablet by mouth Daily.    [DISCONTINUED] rosuvastatin (CRESTOR) 20 MG tablet Take 1 tablet by mouth Every Night.    [DISCONTINUED] zolpidem (AMBIEN) 10 MG tablet Take 1 tablet by mouth Every Night.     No current facility-administered medications on file prior to visit.       Vital Signs:   /75 (BP Location: Right arm, Patient Position: Sitting)   Pulse 86   Temp 98 °F (36.7 °C) (Oral)   Ht 174 cm (68.5\")   Wt 95.1 kg (209 lb 9.6 oz)   SpO2 94%   BMI 31.40 kg/m²       Physical Exam:  Physical Exam  Vitals reviewed.   Constitutional:       General: He is not in acute distress.     Appearance: He is not ill-appearing.   Eyes:      Pupils: Pupils are equal, round, and reactive to light.   Neck:      Comments: No thyromegaly  Cardiovascular:      Rate and Rhythm: Normal rate and regular rhythm.   Pulmonary:      Effort: Pulmonary effort is normal.      Breath sounds: Normal " breath sounds.   Abdominal:      General: There is no distension.      Palpations: Abdomen is soft.      Tenderness: There is no abdominal tenderness.   Musculoskeletal:      Cervical back: Neck supple.   Lymphadenopathy:      Cervical: No cervical adenopathy.   Skin:     Findings: No lesion or rash.   Neurological:      Mental Status: He is alert.       Result Review   The following data was reviewed by Marco Verdin MD on 04/30/2024.  Lab Results   Component Value Date    WBC 7.32 10/30/2023    HGB 16.6 10/30/2023    HCT 49.7 10/30/2023    MCV 97.1 (H) 10/30/2023     10/30/2023     Lab Results   Component Value Date    GLUCOSE 107 (H) 10/30/2023    BUN 19 10/30/2023    CREATININE 1.17 10/30/2023     10/30/2023    K 3.8 10/30/2023     10/30/2023    CO2 30.9 (H) 10/30/2023    CALCIUM 9.9 10/30/2023    PROTEINTOT 6.5 10/30/2023    ALBUMIN 4.0 10/30/2023    ALT 55 (H) 10/30/2023    AST 35 10/30/2023    ALKPHOS 55 10/30/2023    BILITOT 0.7 10/30/2023    EGFR 63.4 10/30/2023    GLOB 2.5 10/30/2023    AGRATIO 1.6 10/30/2023    BCR 16.2 10/30/2023    ANIONGAP 7.1 10/30/2023      Lab Results   Component Value Date    CHOL 130 10/30/2023    CHLPL 130 11/23/2020    TRIG 100 10/30/2023    HDL 43 10/30/2023    LDL 68 10/30/2023     Lab Results   Component Value Date    TSH 2.260 10/30/2023     Lab Results   Component Value Date    HGBA1C 5.80 (H) 10/30/2023     Lab Results   Component Value Date    PSA 6.450 (H) 02/02/2024    PSA 7.290 (H) 10/30/2023    PSA 7.090 (H) 03/21/2023     BMI is >= 30 and <35. (Class 1 Obesity). The following options were offered after discussion;: exercise counseling/recommendations and nutrition counseling/recommendations         Assessment and Plan:   Today, we have reviewed his care.  Overall, he seems well today.  Ambien does continue to be of benefit, and I will be refilled as noted below.  There have been no concerns about his use of the medication.  Otherwise, we  will refill his usual medications and update some blood work.  He has noted a little bit of discomfort with urination.  We will check a urinalysis as a precaution.  Tentative follow-up will be again in about 6 months.    Diagnoses and all orders for this visit:    1. Primary insomnia (Primary)  -     zolpidem (AMBIEN) 10 MG tablet; Take 1 tablet by mouth Every Night.  Dispense: 90 tablet; Refill: 1    2. Other long term (current) drug therapy  -     zolpidem (AMBIEN) 10 MG tablet; Take 1 tablet by mouth Every Night.  Dispense: 90 tablet; Refill: 1  -     CBC (No Diff); Future    3. Essential hypertension  -     hydroCHLOROthiazide 25 MG tablet; Take 1 tablet by mouth Daily.  Dispense: 90 tablet; Refill: 3  -     Comprehensive Metabolic Panel; Future    4. Mixed hyperlipidemia  -     rosuvastatin (CRESTOR) 20 MG tablet; Take 1 tablet by mouth Every Night.  Dispense: 90 tablet; Refill: 3  -     Comprehensive Metabolic Panel; Future    5. Dysuria  -     Urinalysis With Microscopic - Urine, Clean Catch; Future    6. Hyperglycemia  -     Hemoglobin A1c; Future    Follow Up  Return in about 6 months (around 10/31/2024) for Recheck, Medicare Wellness.  Patient was given instructions and counseling regarding his condition or for health maintenance advice. Please see specific information pulled into the AVS if appropriate.

## 2024-05-01 LAB — HBA1C MFR BLD: 5.7 % (ref 4.8–5.6)

## 2024-05-02 LAB — BACTERIA SPEC AEROBE CULT: ABNORMAL

## 2024-05-02 RX ORDER — AMOXICILLIN 500 MG/1
1000 CAPSULE ORAL 3 TIMES DAILY
Qty: 21 CAPSULE | Refills: 0 | Status: SHIPPED | OUTPATIENT
Start: 2024-05-02

## 2024-10-24 DIAGNOSIS — Z79.899 OTHER LONG TERM (CURRENT) DRUG THERAPY: ICD-10-CM

## 2024-10-24 DIAGNOSIS — F51.01 PRIMARY INSOMNIA: ICD-10-CM

## 2024-10-24 RX ORDER — ZOLPIDEM TARTRATE 10 MG/1
10 TABLET ORAL NIGHTLY
Qty: 90 TABLET | Refills: 0 | Status: SHIPPED | OUTPATIENT
Start: 2024-10-24

## 2024-10-24 NOTE — TELEPHONE ENCOUNTER
I have reviewed Dutch which shows consistent pattern of use of the medication.  There have been no previous concerns.  He does have a follow-up scheduled early next month.  After reviewing his care, I do think it is reasonable to move ahead with a refill now.  This has been sent.  Thanks.

## 2024-10-25 ENCOUNTER — CLINICAL SUPPORT (OUTPATIENT)
Dept: FAMILY MEDICINE CLINIC | Age: 79
End: 2024-10-25
Payer: MEDICARE

## 2024-10-25 DIAGNOSIS — Z23 IMMUNIZATION DUE: Primary | ICD-10-CM

## 2024-10-25 PROCEDURE — G0008 ADMIN INFLUENZA VIRUS VAC: HCPCS | Performed by: FAMILY MEDICINE

## 2024-10-25 PROCEDURE — 90662 IIV NO PRSV INCREASED AG IM: CPT | Performed by: FAMILY MEDICINE

## 2024-11-04 ENCOUNTER — LAB (OUTPATIENT)
Dept: LAB | Facility: HOSPITAL | Age: 79
End: 2024-11-04
Payer: MEDICARE

## 2024-11-04 ENCOUNTER — OFFICE VISIT (OUTPATIENT)
Dept: FAMILY MEDICINE CLINIC | Age: 79
End: 2024-11-04
Payer: MEDICARE

## 2024-11-04 VITALS
HEIGHT: 69 IN | HEART RATE: 80 BPM | TEMPERATURE: 97.7 F | BODY MASS INDEX: 30.98 KG/M2 | DIASTOLIC BLOOD PRESSURE: 78 MMHG | OXYGEN SATURATION: 96 % | SYSTOLIC BLOOD PRESSURE: 123 MMHG | WEIGHT: 209.2 LBS

## 2024-11-04 DIAGNOSIS — R97.20 ELEVATED PSA: ICD-10-CM

## 2024-11-04 DIAGNOSIS — E53.8 B12 DEFICIENCY: ICD-10-CM

## 2024-11-04 DIAGNOSIS — I48.91 ATRIAL FIBRILLATION, UNSPECIFIED TYPE: ICD-10-CM

## 2024-11-04 DIAGNOSIS — Z12.5 PROSTATE CANCER SCREENING: ICD-10-CM

## 2024-11-04 DIAGNOSIS — F51.01 PRIMARY INSOMNIA: ICD-10-CM

## 2024-11-04 DIAGNOSIS — I10 ESSENTIAL HYPERTENSION: ICD-10-CM

## 2024-11-04 DIAGNOSIS — Z79.899 OTHER LONG TERM (CURRENT) DRUG THERAPY: ICD-10-CM

## 2024-11-04 DIAGNOSIS — Z00.00 PHYSICAL EXAM: Primary | ICD-10-CM

## 2024-11-04 DIAGNOSIS — E78.2 MIXED HYPERLIPIDEMIA: ICD-10-CM

## 2024-11-04 LAB
ALBUMIN SERPL-MCNC: 4.3 G/DL (ref 3.5–5.2)
ALBUMIN/GLOB SERPL: 1.7 G/DL
ALP SERPL-CCNC: 67 U/L (ref 39–117)
ALT SERPL W P-5'-P-CCNC: 32 U/L (ref 1–41)
AMPHET+METHAMPHET UR QL: NEGATIVE
AMPHETAMINES UR QL: NEGATIVE
ANION GAP SERPL CALCULATED.3IONS-SCNC: 8.9 MMOL/L (ref 5–15)
AST SERPL-CCNC: 32 U/L (ref 1–40)
BARBITURATES UR QL SCN: NEGATIVE
BENZODIAZ UR QL SCN: NEGATIVE
BILIRUB SERPL-MCNC: 0.7 MG/DL (ref 0–1.2)
BUN SERPL-MCNC: 11 MG/DL (ref 8–23)
BUN/CREAT SERPL: 10.6 (ref 7–25)
BUPRENORPHINE SERPL-MCNC: NEGATIVE NG/ML
CALCIUM SPEC-SCNC: 9.7 MG/DL (ref 8.6–10.5)
CANNABINOIDS SERPL QL: NEGATIVE
CHLORIDE SERPL-SCNC: 102 MMOL/L (ref 98–107)
CHOLEST SERPL-MCNC: 120 MG/DL (ref 0–200)
CO2 SERPL-SCNC: 30.1 MMOL/L (ref 22–29)
COCAINE UR QL: NEGATIVE
CREAT SERPL-MCNC: 1.04 MG/DL (ref 0.76–1.27)
DEPRECATED RDW RBC AUTO: 45.7 FL (ref 37–54)
EGFRCR SERPLBLD CKD-EPI 2021: 72.6 ML/MIN/1.73
ERYTHROCYTE [DISTWIDTH] IN BLOOD BY AUTOMATED COUNT: 12.8 % (ref 12.3–15.4)
EXPIRATION DATE: NORMAL
FOLATE SERPL-MCNC: 8.86 NG/ML (ref 4.78–24.2)
GLOBULIN UR ELPH-MCNC: 2.6 GM/DL
GLUCOSE SERPL-MCNC: 93 MG/DL (ref 65–99)
HCT VFR BLD AUTO: 48.4 % (ref 37.5–51)
HDLC SERPL-MCNC: 36 MG/DL (ref 40–60)
HGB BLD-MCNC: 16.2 G/DL (ref 13–17.7)
LDLC SERPL CALC-MCNC: 63 MG/DL (ref 0–100)
LDLC/HDLC SERPL: 1.7 {RATIO}
Lab: NORMAL
MCH RBC QN AUTO: 32 PG (ref 26.6–33)
MCHC RBC AUTO-ENTMCNC: 33.5 G/DL (ref 31.5–35.7)
MCV RBC AUTO: 95.7 FL (ref 79–97)
MDMA UR QL SCN: NEGATIVE
METHADONE UR QL SCN: NEGATIVE
MORPHINE/OPIATES SCREEN, URINE: NEGATIVE
OXYCODONE UR QL SCN: NEGATIVE
PCP UR QL SCN: NEGATIVE
PLATELET # BLD AUTO: 187 10*3/MM3 (ref 140–450)
PMV BLD AUTO: 9.8 FL (ref 6–12)
POTASSIUM SERPL-SCNC: 3.7 MMOL/L (ref 3.5–5.2)
PROT SERPL-MCNC: 6.9 G/DL (ref 6–8.5)
PSA SERPL-MCNC: 7.74 NG/ML (ref 0–4)
RBC # BLD AUTO: 5.06 10*6/MM3 (ref 4.14–5.8)
SODIUM SERPL-SCNC: 141 MMOL/L (ref 136–145)
TRIGL SERPL-MCNC: 114 MG/DL (ref 0–150)
TSH SERPL DL<=0.05 MIU/L-ACNC: 1.58 UIU/ML (ref 0.27–4.2)
VIT B12 BLD-MCNC: 267 PG/ML (ref 211–946)
VLDLC SERPL-MCNC: 21 MG/DL (ref 5–40)
WBC NRBC COR # BLD AUTO: 5.38 10*3/MM3 (ref 3.4–10.8)

## 2024-11-04 PROCEDURE — 82607 VITAMIN B-12: CPT

## 2024-11-04 PROCEDURE — 3078F DIAST BP <80 MM HG: CPT | Performed by: FAMILY MEDICINE

## 2024-11-04 PROCEDURE — 3074F SYST BP LT 130 MM HG: CPT | Performed by: FAMILY MEDICINE

## 2024-11-04 PROCEDURE — 1126F AMNT PAIN NOTED NONE PRSNT: CPT | Performed by: FAMILY MEDICINE

## 2024-11-04 PROCEDURE — 80061 LIPID PANEL: CPT

## 2024-11-04 PROCEDURE — 84443 ASSAY THYROID STIM HORMONE: CPT

## 2024-11-04 PROCEDURE — 1160F RVW MEDS BY RX/DR IN RCRD: CPT | Performed by: FAMILY MEDICINE

## 2024-11-04 PROCEDURE — 99214 OFFICE O/P EST MOD 30 MIN: CPT | Performed by: FAMILY MEDICINE

## 2024-11-04 PROCEDURE — 82746 ASSAY OF FOLIC ACID SERUM: CPT

## 2024-11-04 PROCEDURE — G0103 PSA SCREENING: HCPCS

## 2024-11-04 PROCEDURE — 85027 COMPLETE CBC AUTOMATED: CPT

## 2024-11-04 PROCEDURE — 36415 COLL VENOUS BLD VENIPUNCTURE: CPT

## 2024-11-04 PROCEDURE — 1159F MED LIST DOCD IN RCRD: CPT | Performed by: FAMILY MEDICINE

## 2024-11-04 PROCEDURE — 80053 COMPREHEN METABOLIC PANEL: CPT

## 2024-11-04 PROCEDURE — 1170F FXNL STATUS ASSESSED: CPT | Performed by: FAMILY MEDICINE

## 2024-11-04 PROCEDURE — G0439 PPPS, SUBSEQ VISIT: HCPCS | Performed by: FAMILY MEDICINE

## 2024-11-04 RX ORDER — ZOLPIDEM TARTRATE 10 MG/1
10 TABLET ORAL NIGHTLY
Qty: 90 TABLET | Refills: 1 | Status: SHIPPED | OUTPATIENT
Start: 2024-11-04

## 2024-11-04 RX ORDER — ROSUVASTATIN CALCIUM 20 MG/1
20 TABLET, COATED ORAL NIGHTLY
Qty: 90 TABLET | Refills: 3 | Status: SHIPPED | OUTPATIENT
Start: 2024-11-04

## 2024-11-04 RX ORDER — AMLODIPINE BESYLATE 5 MG/1
5 TABLET ORAL DAILY
COMMUNITY
Start: 2024-08-13 | End: 2024-11-04 | Stop reason: SDUPTHER

## 2024-11-04 RX ORDER — HYDROCHLOROTHIAZIDE 25 MG/1
25 TABLET ORAL DAILY
Qty: 90 TABLET | Refills: 3 | Status: SHIPPED | OUTPATIENT
Start: 2024-11-04

## 2024-11-04 RX ORDER — AMLODIPINE BESYLATE 5 MG/1
5 TABLET ORAL DAILY
Qty: 90 TABLET | Refills: 3 | Status: SHIPPED | OUTPATIENT
Start: 2024-11-04

## 2024-11-04 NOTE — PROGRESS NOTES
The ABCs of the Annual Wellness Visit  Subsequent Medicare Wellness Visit    Subjective    Magnus Rousseau is a 79 y.o. patient who presents for a Subsequent Medicare Wellness Visit.    The following portions of the patient's history were reviewed and updated as appropriate: allergies, current medications, past family history, past medical history, past social history, past surgical history, and problem list.    Compared to one year ago, the patient feels his physical health is worse.  He is more tired than he was last year.    Compared to one year ago, the patient feels his mental health is the same.    Recent Hospitalizations:  He was not admitted to the hospital during the last year.     Current Medical Providers:  Patient Care Team:  Marco Verdin MD as PCP - General (Family Medicine)  John Dumont DO as Consulting Physician (Cardiac Electrophysiology)    Outpatient Medications Prior to Visit   Medication Sig Dispense Refill    warfarin (COUMADIN) 2.5 MG tablet Take 1 tablet by mouth Daily. 5MG ON FRIDAY 2.5 REST      warfarin (COUMADIN) 5 MG tablet Take 1 tablet by mouth Daily. 5MG ON FRIDAY 2.5 REST      amLODIPine (NORVASC) 5 MG tablet Take 1 tablet by mouth Daily.      hydroCHLOROthiazide 25 MG tablet Take 1 tablet by mouth Daily. 90 tablet 3    rosuvastatin (CRESTOR) 20 MG tablet Take 1 tablet by mouth Every Night. 90 tablet 3    zolpidem (AMBIEN) 10 MG tablet Take 1 tablet by mouth Every Night. 90 tablet 0    amoxicillin (AMOXIL) 500 MG capsule Take 2 capsules by mouth 3 (Three) Times a Day. 21 capsule 0     No facility-administered medications prior to visit.       No opioid medication identified on active medication list. I have reviewed chart for other potential  high risk medication/s and harmful drug interactions in the elderly.      Aspirin is not on active medication list.  Aspirin use is not indicated based on review of current medical condition/s. Risk of harm outweighs potential  "benefits.      Patient Active Problem List   Diagnosis    Primary insomnia    Essential hypertension    Mixed hyperlipidemia    Atrial fibrillation    Other long term (current) drug therapy     Advance Care Planning  Advance Directive is on file.  ACP discussion was held with the patient during this visit. Patient has an advance directive in EMR which is still valid.  His wife, Miranda, would make decisions if needed.     Objective    Vitals:    24 1123   BP: 123/78   BP Location: Right arm   Patient Position: Sitting   Pulse: 80   Temp: 97.7 °F (36.5 °C)   TempSrc: Oral   SpO2: 96%   Weight: 94.9 kg (209 lb 3.2 oz)   Height: 174 cm (68.5\")   PainSc: 0-No pain     Estimated body mass index is 31.34 kg/m² as calculated from the following:    Height as of this encounter: 174 cm (68.5\").    Weight as of this encounter: 94.9 kg (209 lb 3.2 oz).    BMI is >= 30 and <35. (Class 1 Obesity). The following options were offered after discussion;: exercise counseling/recommendations and nutrition counseling/recommendations    Does the patient have evidence of cognitive impairment? No        HEALTH RISK ASSESSMENT    Smoking Status:  Social History     Tobacco Use   Smoking Status Never    Passive exposure: Past   Smokeless Tobacco Never     Alcohol Consumption:  Social History     Substance and Sexual Activity   Alcohol Use Yes    Alcohol/week: 1.0 standard drink of alcohol    Types: 1 Cans of beer per week    Comment: maybe 1 beer a night      Fall Risk Screen:    RONNY Fall Risk Assessment was completed, and patient is at LOW risk for falls.Assessment completed on:2024    Depression Screenin/4/2024    11:22 AM   PHQ-2/PHQ-9 Depression Screening   Little interest or pleasure in doing things Not at all   Feeling down, depressed, or hopeless Not at all       Health Habits and Functional and Cognitive Screenin/4/2024    11:23 AM   Functional & Cognitive Status   Do you have difficulty preparing food " and eating? No   Do you have difficulty bathing yourself, getting dressed or grooming yourself? No   Do you have difficulty using the toilet? No   Do you have difficulty moving around from place to place? No   Do you have trouble with steps or getting out of a bed or a chair? No   Current Diet Well Balanced Diet   Dental Exam Not up to date   Eye Exam Up to date   Exercise (times per week) 0 times per week   Current Exercises Include No Regular Exercise   Do you need help using the phone?  No   Are you deaf or do you have serious difficulty hearing?  No   Do you need help to go to places out of walking distance? No   Do you need help shopping? No   Do you need help preparing meals?  No   Do you need help with housework?  No   Do you need help with laundry? No   Do you need help taking your medications? No   Do you need help managing money? No   Do you ever drive or ride in a car without wearing a seat belt? No   Have you felt unusual stress, anger or loneliness in the last month? No   Who do you live with? Spouse   If you need help, do you have trouble finding someone available to you? No   Have you been bothered in the last four weeks by sexual problems? No   Do you have difficulty concentrating, remembering or making decisions? No       Age-appropriate Screening Schedule:  Refer to the list below for future screening recommendations based on patient's age, sex and/or medical conditions. Orders for these recommended tests are listed in the plan section. The patient has been provided with a written plan.    Health Maintenance   Topic Date Due    ZOSTER VACCINE (1 of 2) Never done    RSV Vaccine - Adults (1 - 1-dose 75+ series) Never done    LIPID PANEL  10/30/2024    COVID-19 Vaccine (4 - 2024-25 season) 11/04/2025 (Originally 9/1/2024)    BMI FOLLOWUP  04/30/2025    ANNUAL WELLNESS VISIT  11/04/2025    COLORECTAL CANCER SCREENING  02/06/2026    TDAP/TD VACCINES (2 - Tdap) 08/17/2032    INFLUENZA VACCINE  Completed     Pneumococcal Vaccine 65+  Completed          CMS Preventative Services Quick Reference  Risk Factors Identified During Encounter  Immunizations Discussed/Encouraged: Shingrix, COVID19, and RSV (Respiratory Syncytial Virus)  The above risks/problems have been discussed with the patient.  Pertinent information has been shared with the patient in the After Visit Summary.  An After Visit Summary and PPPS were made available to the patient.    Follow Up:   Next Medicare Wellness visit to be scheduled in 1 year.     Additional E&M Note during same encounter follows:  Patient has multiple medical problems which are significant and separately identifiable that require additional work above and beyond the Medicare Wellness Visit.      Chief Complaint:  Insomnia, blood pressure, cholesterol    Subjective    History of Present Illness:  In addition to the Medicare wellness exam, Magnus is being seen today for follow-up on his care.  He has significant insomnia for which he takes Ambien nightly..  He tolerates the medication well and continues to feel it is necessary.   We previously decreased the dose to 5 mg nightly, but it was not helpful for him.  He denies any unusual sleep-related behaviors.  Dutch is reviewed.      He also has hypertension and elevated cholesterol.  He remains on treatment for these issues.  His blood pressure is well-controlled today.  He checks his blood pressure intermittently at home, and it typically is in a good range there as well.  He tolerates the medications well.    Review of Systems:  Review of Systems   Constitutional:  Negative for chills and fever.   Respiratory:  Negative for cough and shortness of breath.    Cardiovascular:  Negative for chest pain and palpitations.   Gastrointestinal:  Negative for abdominal pain, nausea and vomiting.      Objective   Vital Signs:  /78 (BP Location: Right arm, Patient Position: Sitting)   Pulse 80   Temp 97.7 °F (36.5 °C) (Oral)   Ht 174 cm  "(68.5\")   Wt 94.9 kg (209 lb 3.2 oz)   SpO2 96%   BMI 31.34 kg/m²     Physical Exam  Vitals and nursing note reviewed.   Constitutional:       General: He is not in acute distress.     Appearance: He is not ill-appearing.   HENT:      Right Ear: Tympanic membrane and ear canal normal.      Left Ear: Tympanic membrane and ear canal normal.      Ears:      Comments: Hearing is normal with forced whisper bilaterally.     Mouth/Throat:      Mouth: Mucous membranes are moist.      Comments: Pharynx appears normal  Eyes:      Extraocular Movements: Extraocular movements intact.      Pupils: Pupils are equal, round, and reactive to light.      Comments: Binocular vision is 20/20 with correction.   Neck:      Thyroid: No thyromegaly.   Cardiovascular:      Rate and Rhythm: Normal rate and regular rhythm.      Heart sounds: No murmur heard.  Pulmonary:      Effort: Pulmonary effort is normal.      Breath sounds: Normal breath sounds.   Abdominal:      General: There is no distension.      Palpations: Abdomen is soft. There is no mass.      Tenderness: There is no abdominal tenderness.   Musculoskeletal:      Cervical back: Normal range of motion.   Skin:     Findings: No lesion or rash.   Neurological:      General: No focal deficit present.      Mental Status: He is oriented to person, place, and time.      Cranial Nerves: No cranial nerve deficit.      Motor: No weakness.      Coordination: Coordination normal.   Psychiatric:         Mood and Affect: Mood normal.       The following data was reviewed by Marco Verdin MD on 11/04/2024.  Lab Results   Component Value Date    WBC 6.72 04/30/2024    HGB 16.0 04/30/2024    HCT 47.9 04/30/2024    MCV 96.2 04/30/2024     04/30/2024     Lab Results   Component Value Date    GLUCOSE 105 (H) 04/30/2024    BUN 12 04/30/2024    CREATININE 0.99 04/30/2024     04/30/2024    K 4.1 04/30/2024     04/30/2024    CO2 29.6 (H) 04/30/2024    CALCIUM 9.9 " 04/30/2024    PROTEINTOT 6.9 04/30/2024    ALBUMIN 4.1 04/30/2024    ALT 25 04/30/2024    AST 28 04/30/2024    ALKPHOS 83 04/30/2024    BILITOT 0.5 04/30/2024    EGFR 77.0 04/30/2024    GLOB 2.8 04/30/2024    AGRATIO 1.5 04/30/2024    BCR 12.1 04/30/2024    ANIONGAP 8.4 04/30/2024      Lab Results   Component Value Date    CHOL 130 10/30/2023    CHLPL 130 11/23/2020    TRIG 100 10/30/2023    HDL 43 10/30/2023    LDL 68 10/30/2023     Lab Results   Component Value Date    TSH 2.260 10/30/2023     Lab Results   Component Value Date    HGBA1C 5.70 (H) 04/30/2024     Lab Results   Component Value Date    PSA 6.450 (H) 02/02/2024    PSA 7.290 (H) 10/30/2023    PSA 7.090 (H) 03/21/2023             Assessment and Plan:   Today, we have reviewed his care.  Overall, Magnus seems well.  Regarding the Medicare wellness exam, he is basically up-to-date on recommended cancer screenings.  We will recheck a PSA today.  We also reviewed vaccines as noted above.    Regarding his usual care, we will plan to refill his medications and update labs as noted below.  The PSA has been running above normal, and we will continue to follow that.  Regarding Ambien, it continues to be helpful, there has been no concern about side effects with it.  We will plan to refill it for him as noted.  Urine tox will be obtained today.  Tentative follow-up with me will be again in about 6 months, sooner if needed.    Diagnoses and all orders for this visit:    1. Physical exam (Primary)    2. Essential hypertension  -     Comprehensive Metabolic Panel; Future  -     hydroCHLOROthiazide 25 MG tablet; Take 1 tablet by mouth Daily.  Dispense: 90 tablet; Refill: 3  -     amLODIPine (NORVASC) 5 MG tablet; Take 1 tablet by mouth Daily.  Dispense: 90 tablet; Refill: 3    3. Mixed hyperlipidemia  -     Comprehensive Metabolic Panel; Future  -     Lipid Panel; Future  -     TSH; Future  -     rosuvastatin (CRESTOR) 20 MG tablet; Take 1 tablet by mouth Every Night.   Dispense: 90 tablet; Refill: 3    4. Atrial fibrillation, unspecified type  -     CBC (No Diff); Future  -     TSH; Future    5. Primary insomnia  -     zolpidem (AMBIEN) 10 MG tablet; Take 1 tablet by mouth Every Night.  Dispense: 90 tablet; Refill: 1    6. B12 deficiency  -     CBC (No Diff); Future  -     Vitamin B12 & Folate; Future    7. Other long term (current) drug therapy  -     POC Medline 12 Panel Urine Drug Screen  -     CBC (No Diff); Future  -     zolpidem (AMBIEN) 10 MG tablet; Take 1 tablet by mouth Every Night.  Dispense: 90 tablet; Refill: 1    8. Prostate cancer screening  -     PSA Screen; Future    9. Elevated PSA  -     PSA Screen; Future       Follow Up  Return in about 6 months (around 5/4/2025) for Recheck, Next scheduled follow up.  Patient was given instructions and counseling regarding his condition or for health maintenance advice. Please see specific information pulled into the AVS if appropriate.

## 2024-11-27 ENCOUNTER — CLINICAL SUPPORT (OUTPATIENT)
Dept: FAMILY MEDICINE CLINIC | Age: 79
End: 2024-11-27
Payer: MEDICARE

## 2024-11-27 VITALS — DIASTOLIC BLOOD PRESSURE: 81 MMHG | HEART RATE: 78 BPM | SYSTOLIC BLOOD PRESSURE: 137 MMHG

## 2024-11-27 NOTE — Clinical Note
"Pt stated bp was running \"a little elevated\" at home (pt took bp immediately after working with his horses) and just wanted to come in for a bp check. "

## 2024-11-28 NOTE — PROGRESS NOTES
Vitals:    11/27/24 1302   BP: 137/81   BP Location: Left arm   Patient Position: Sitting   Pulse: 78     Good morning, Magnus.  I hope you are well.  Your blood pressure yesterday was reasonable.  I would not recommend changing care based on these results.  If you see a trend of the morning blood pressure running above 160, please reach back out though.  In that case, we may want to move amlodipine to nighttime dosing gradually if you take it in the mornings.  Otherwise, continue your current care, and plan to see me in early May as scheduled, sooner if needed.  Let me know if you have other concerns.  I hope you have a good Thanksgiving.    Marco eVrdin MD  [facility]    PS - [employer] messages are not reviewed on an urgent basis.  It may be several days before we review and/or respond to your message.  If you have an urgent need, please call the office at [phone].

## 2025-02-04 ENCOUNTER — DOCUMENTATION (OUTPATIENT)
Dept: FAMILY MEDICINE CLINIC | Age: 80
End: 2025-02-04
Payer: MEDICARE

## 2025-02-05 ENCOUNTER — TELEPHONE (OUTPATIENT)
Dept: FAMILY MEDICINE CLINIC | Age: 80
End: 2025-02-05
Payer: MEDICARE

## 2025-02-05 DIAGNOSIS — R97.20 ELEVATED PSA: Primary | ICD-10-CM

## 2025-02-05 NOTE — TELEPHONE ENCOUNTER
----- Message from Amber NAIR sent at 11/5/2024 10:06 AM EST -----  TICKLE PSA diagnostic in 90 days for elevated PSA.

## 2025-02-06 ENCOUNTER — LAB (OUTPATIENT)
Dept: LAB | Facility: HOSPITAL | Age: 80
End: 2025-02-06
Payer: MEDICARE

## 2025-02-06 DIAGNOSIS — R97.20 ELEVATED PSA: ICD-10-CM

## 2025-02-06 LAB — PSA SERPL-MCNC: 10.5 NG/ML (ref 0–4)

## 2025-02-06 PROCEDURE — 84153 ASSAY OF PSA TOTAL: CPT

## 2025-02-06 PROCEDURE — 36415 COLL VENOUS BLD VENIPUNCTURE: CPT

## 2025-02-18 ENCOUNTER — OFFICE VISIT (OUTPATIENT)
Dept: FAMILY MEDICINE CLINIC | Age: 80
End: 2025-02-18
Payer: MEDICARE

## 2025-02-18 VITALS
BODY MASS INDEX: 31.28 KG/M2 | SYSTOLIC BLOOD PRESSURE: 126 MMHG | OXYGEN SATURATION: 98 % | DIASTOLIC BLOOD PRESSURE: 78 MMHG | TEMPERATURE: 97.7 F | HEIGHT: 69 IN | WEIGHT: 211.2 LBS | HEART RATE: 95 BPM

## 2025-02-18 DIAGNOSIS — R97.20 ELEVATED PSA: Primary | ICD-10-CM

## 2025-02-18 PROCEDURE — 99213 OFFICE O/P EST LOW 20 MIN: CPT | Performed by: FAMILY MEDICINE

## 2025-02-18 PROCEDURE — 1126F AMNT PAIN NOTED NONE PRSNT: CPT | Performed by: FAMILY MEDICINE

## 2025-02-18 PROCEDURE — 3078F DIAST BP <80 MM HG: CPT | Performed by: FAMILY MEDICINE

## 2025-02-18 PROCEDURE — 3074F SYST BP LT 130 MM HG: CPT | Performed by: FAMILY MEDICINE

## 2025-02-18 PROCEDURE — G2211 COMPLEX E/M VISIT ADD ON: HCPCS | Performed by: FAMILY MEDICINE

## 2025-02-18 NOTE — PROGRESS NOTES
Magnus Rousseau presents to [facility].    Chief Complaint:  Elevated PSA    Subjective   History of Present Illness:  Magnus is being seen today for follow-up on his care.  He was noted on recent testing to have a PSA of 10.5.  His most recent PSA was 7.74 about 90 days ago.  In reviewing his chart, his PSA level has been between 6 and 7 over the last 2.5 years roughly.  He states it has never been at this level.  Magnus has not been diagnosed with prostate cancer up to this time.  We have not recently examined the prostate gland due to age.  He denies any family history of prostate cancer.  Magnus has variable issues with his urine.  Some days, he has basically normal flow.  Other days, he will experience urinary hesitancy and a weakened stream.    Review of Systems:  Review of Systems   Constitutional:  Negative for chills and fever.   Respiratory:  Negative for cough and shortness of breath.    Cardiovascular:  Negative for chest pain and palpitations.   Gastrointestinal:  Negative for abdominal pain, nausea and vomiting.      Objective   Medical History:  Past Medical History:    Elevated prostate specific antigen (PSA)    Essential hypertension    Mixed hyperlipidemia    Other long term (current) drug therapy    Primary insomnia    Unspecified atrial fibrillation     Past Surgical History:    APPENDECTOMY    PACEMAKER IMPLANTATION    RECTAL FISTULA CLOSURE WITH FIBRIN GLUE      Family History   Problem Relation Age of Onset    Suicide Attempts Mother     Coronary artery disease Father     Cancer Brother         unspecified    Heart attack Brother      Social History     Tobacco Use    Smoking status: Never     Passive exposure: Past    Smokeless tobacco: Never   Substance Use Topics    Alcohol use: Yes     Alcohol/week: 1.0 standard drink of alcohol     Types: 1 Cans of beer per week     Comment: maybe 1 beer a night        Health Maintenance Due   Topic Date Due    ZOSTER VACCINE (1  "of 2) Never done    RSV Vaccine - Adults (1 - 1-dose 75+ series) Never done        Immunization History   Administered Date(s) Administered    COVID-19 (MODERNA) 1st,2nd,3rd Dose Monovalent 02/27/2021, 03/27/2021    COVID-19 (MODERNA) Monovalent Original Booster 01/30/2022    Flu Vaccine Intradermal Quad 18-64YR 09/21/2012, 10/01/2020    Fluzone High-Dose 65+YRS 10/11/2013, 11/06/2017, 10/25/2024    Fluzone High-Dose 65+yrs 09/20/2022, 10/30/2023    Influenza Seasonal Injectable 09/21/2012    Pneumococcal Conjugate 13-Valent (PCV13) 06/27/2019    Pneumococcal Polysaccharide (PPSV23) 03/05/2011    Td (TDVAX) 08/17/2022       No Known Allergies     Medications:    Current Outpatient Medications:     amLODIPine (NORVASC) 5 MG tablet, Take 1 tablet by mouth Daily., Disp: 90 tablet, Rfl: 3    hydroCHLOROthiazide 25 MG tablet, Take 1 tablet by mouth Daily., Disp: 90 tablet, Rfl: 3    rosuvastatin (CRESTOR) 20 MG tablet, Take 1 tablet by mouth Every Night., Disp: 90 tablet, Rfl: 3    warfarin (COUMADIN) 2.5 MG tablet, Take 1 tablet by mouth Daily. 5MG ON FRIDAY 2.5 REST, Disp: , Rfl:     warfarin (COUMADIN) 5 MG tablet, Take 1 tablet by mouth Daily. 5MG ON FRIDAY 2.5 REST, Disp: , Rfl:     zolpidem (AMBIEN) 10 MG tablet, Take 1 tablet by mouth Every Night., Disp: 90 tablet, Rfl: 1    Vital Signs:   /78 (BP Location: Left arm, Patient Position: Sitting)   Pulse 95   Temp 97.7 °F (36.5 °C) (Oral)   Ht 174 cm (68.5\")   Wt 95.8 kg (211 lb 3.2 oz)   SpO2 98%   BMI 31.64 kg/m²     Physical Exam:  Physical Exam  Vitals reviewed. Chaperone present: patient declined chaperone.   Constitutional:       General: He is not in acute distress.     Appearance: He is not ill-appearing.   Eyes:      Pupils: Pupils are equal, round, and reactive to light.   Neck:      Comments: No thyromegaly  Cardiovascular:      Rate and Rhythm: Normal rate and regular rhythm.   Pulmonary:      Effort: Pulmonary effort is normal.      Breath " sounds: Normal breath sounds.   Abdominal:      General: There is no distension.      Palpations: Abdomen is soft.      Tenderness: There is no abdominal tenderness.   Genitourinary:     Prostate: Enlarged. No nodules present.      Rectum: Guaiac result negative. No mass.   Musculoskeletal:      Cervical back: Neck supple.   Lymphadenopathy:      Cervical: No cervical adenopathy.   Skin:     Findings: No lesion or rash.   Neurological:      Mental Status: He is alert.     Result Review   The following data was reviewed by Marco Verdin MD on 02/18/2025.  Lab Results   Component Value Date    WBC 5.38 11/04/2024    HGB 16.2 11/04/2024    HCT 48.4 11/04/2024    MCV 95.7 11/04/2024     11/04/2024     Lab Results   Component Value Date    GLUCOSE 93 11/04/2024    BUN 11 11/04/2024    CREATININE 1.04 11/04/2024     11/04/2024    K 3.7 11/04/2024     11/04/2024    CALCIUM 9.7 11/04/2024    PROTEINTOT 6.9 11/04/2024    ALBUMIN 4.3 11/04/2024    ALT 32 11/04/2024    AST 32 11/04/2024    ALKPHOS 67 11/04/2024    BILITOT 0.7 11/04/2024    GLOB 2.6 11/04/2024    AGRATIO 1.7 11/04/2024    BCR 10.6 11/04/2024    ANIONGAP 8.9 11/04/2024    EGFR 72.6 11/04/2024     Lab Results   Component Value Date    CHOL 120 11/04/2024    CHLPL 130 11/23/2020    TRIG 114 11/04/2024    HDL 36 (L) 11/04/2024    LDL 63 11/04/2024     Lab Results   Component Value Date    TSH 1.580 11/04/2024     Lab Results   Component Value Date    HGBA1C 5.70 (H) 04/30/2024     Lab Results   Component Value Date    PSA 10.500 (H) 02/06/2025    PSA 7.740 (H) 11/04/2024    PSA 6.450 (H) 02/02/2024          Assessment and Plan:   Today, we have reviewed his care.  Magnus's physical exam is reassuring.  However, his PSA is higher than it has been previously.  We discussed that the PSA is an imperfect test.  It can go up and down over time depending on inflammation or even infection of the prostate.  It can also go up related to an  enlarging prostate.  Prostate cancer can also cause this.  We discussed that for most men of his age, prostate cancer if present would be unlikely to lead to death.  Treatment of prostate cancer may have lifelong side effects.  We discussed different options about how to move forward including referral to urology, opting not to check the prostate further, and/or additional follow-up testing.  He has an appointment in early May, and we will plan to recheck the PSA at that time along with other testing that may be needed.  If he decides to go in a different direction between now and then, he will reach out.    Diagnoses and all orders for this visit:    1. Elevated PSA (Primary)    Follow Up  Return in about 2 months (around 5/1/2025) for Recheck, Next scheduled follow up.  Patient was given instructions and counseling regarding his condition or for health maintenance advice. Please see specific information pulled into the AVS if appropriate.

## 2025-03-11 ENCOUNTER — CLINICAL SUPPORT (OUTPATIENT)
Dept: FAMILY MEDICINE CLINIC | Age: 80
End: 2025-03-11
Payer: MEDICARE

## 2025-03-11 LAB — INR PPP: 1.8 (ref 2–3)

## 2025-03-11 PROCEDURE — 85610 PROTHROMBIN TIME: CPT | Performed by: FAMILY MEDICINE

## 2025-03-11 PROCEDURE — 36416 COLLJ CAPILLARY BLOOD SPEC: CPT | Performed by: FAMILY MEDICINE

## 2025-03-25 ENCOUNTER — ANTICOAGULATION VISIT (OUTPATIENT)
Dept: FAMILY MEDICINE CLINIC | Age: 80
End: 2025-03-25
Payer: MEDICARE

## 2025-03-25 DIAGNOSIS — I48.91 ATRIAL FIBRILLATION, UNSPECIFIED TYPE: Primary | ICD-10-CM

## 2025-03-25 LAB — INR PPP: 2.6 (ref 0.9–1.1)

## 2025-04-18 ENCOUNTER — LAB (OUTPATIENT)
Dept: LAB | Facility: HOSPITAL | Age: 80
End: 2025-04-18
Payer: MEDICARE

## 2025-04-18 ENCOUNTER — OFFICE VISIT (OUTPATIENT)
Dept: FAMILY MEDICINE CLINIC | Age: 80
End: 2025-04-18
Payer: MEDICARE

## 2025-04-18 VITALS
HEIGHT: 69 IN | TEMPERATURE: 97.8 F | WEIGHT: 212 LBS | BODY MASS INDEX: 31.4 KG/M2 | DIASTOLIC BLOOD PRESSURE: 80 MMHG | SYSTOLIC BLOOD PRESSURE: 130 MMHG | HEART RATE: 89 BPM | OXYGEN SATURATION: 97 %

## 2025-04-18 DIAGNOSIS — Z79.01 ON WARFARIN THERAPY: ICD-10-CM

## 2025-04-18 DIAGNOSIS — M79.662 PAIN IN LEFT LOWER LEG: Primary | ICD-10-CM

## 2025-04-18 LAB
BASOPHILS # BLD AUTO: 0.02 10*3/MM3 (ref 0–0.2)
BASOPHILS NFR BLD AUTO: 0.3 % (ref 0–1.5)
DEPRECATED RDW RBC AUTO: 45 FL (ref 37–54)
EOSINOPHIL # BLD AUTO: 0.27 10*3/MM3 (ref 0–0.4)
EOSINOPHIL NFR BLD AUTO: 4.1 % (ref 0.3–6.2)
ERYTHROCYTE [DISTWIDTH] IN BLOOD BY AUTOMATED COUNT: 12.5 % (ref 12.3–15.4)
HCT VFR BLD AUTO: 47.9 % (ref 37.5–51)
HGB BLD-MCNC: 16 G/DL (ref 13–17.7)
IMM GRANULOCYTES # BLD AUTO: 0.01 10*3/MM3 (ref 0–0.05)
IMM GRANULOCYTES NFR BLD AUTO: 0.2 % (ref 0–0.5)
INR PPP: 2.93 (ref 0.86–1.15)
LYMPHOCYTES # BLD AUTO: 1.43 10*3/MM3 (ref 0.7–3.1)
LYMPHOCYTES NFR BLD AUTO: 21.7 % (ref 19.6–45.3)
MCH RBC QN AUTO: 31.8 PG (ref 26.6–33)
MCHC RBC AUTO-ENTMCNC: 33.4 G/DL (ref 31.5–35.7)
MCV RBC AUTO: 95.2 FL (ref 79–97)
MONOCYTES # BLD AUTO: 0.75 10*3/MM3 (ref 0.1–0.9)
MONOCYTES NFR BLD AUTO: 11.4 % (ref 5–12)
NEUTROPHILS NFR BLD AUTO: 4.12 10*3/MM3 (ref 1.7–7)
NEUTROPHILS NFR BLD AUTO: 62.3 % (ref 42.7–76)
PLATELET # BLD AUTO: 191 10*3/MM3 (ref 140–450)
PMV BLD AUTO: 10.1 FL (ref 6–12)
PROTHROMBIN TIME: 32.2 SECONDS (ref 11.8–14.9)
RBC # BLD AUTO: 5.03 10*6/MM3 (ref 4.14–5.8)
WBC NRBC COR # BLD AUTO: 6.6 10*3/MM3 (ref 3.4–10.8)

## 2025-04-18 PROCEDURE — 85610 PROTHROMBIN TIME: CPT

## 2025-04-18 PROCEDURE — 85025 COMPLETE CBC W/AUTO DIFF WBC: CPT

## 2025-04-18 PROCEDURE — 36415 COLL VENOUS BLD VENIPUNCTURE: CPT

## 2025-04-18 NOTE — PROGRESS NOTES
Chief Complaint     Leg Injury (Leg bruising & swelling /Pt his leg with a sledgehammer on X 1 week )    History of Present Illness     Magnus Rousseau is a 80 y.o. male who presents to [facility].     Patient or patient representative verbalized consent for the use of Ambient Listening during the visit with  YANG Hudson for chart documentation. 4/18/2025  09:28 EDT      History of Present Illness  The patient is an 80-year-old male who presents for evaluation of left leg pain.    He sustained an injury to his left leg with a sledgehammer last week, which resulted in significant swelling. Initially, the swelling and pain appeared to be subsiding, but the pain has since recurred and intensified, causing discomfort during sleep. He rates the pain as a 7 on a scale of 1 to 10, describing it as constant and steady. There is a sensation of tightness in the ankle when attempting to move it. Despite the pain, ambulation on the affected leg has been possible for the past week. Bruising has also been noted to have spread down the leg. He is currently on warfarin therapy, which may have contributed to the development of a hematoma.         History      Past Medical History:   Diagnosis Date    Elevated prostate specific antigen (PSA)     Essential hypertension     Mixed hyperlipidemia     Other long term (current) drug therapy     Primary insomnia     Unspecified atrial fibrillation        Past Surgical History:   Procedure Laterality Date    APPENDECTOMY      PACEMAKER IMPLANTATION      RECTAL FISTULA CLOSURE WITH FIBRIN GLUE         Family History   Problem Relation Age of Onset    Suicide Attempts Mother     Coronary artery disease Father     Cancer Brother         unspecified    Heart attack Brother         Current Medications        Current Outpatient Medications:     amLODIPine (NORVASC) 5 MG tablet, Take 1 tablet by mouth Daily., Disp: 90 tablet, Rfl: 3    hydroCHLOROthiazide 25  "MG tablet, Take 1 tablet by mouth Daily., Disp: 90 tablet, Rfl: 3    rosuvastatin (CRESTOR) 20 MG tablet, Take 1 tablet by mouth Every Night., Disp: 90 tablet, Rfl: 3    warfarin (COUMADIN) 2.5 MG tablet, Take 1 tablet by mouth Daily. 5MG ON Tuesday 2.5 REST, Disp: , Rfl:     warfarin (COUMADIN) 5 MG tablet, Take 1 tablet by mouth Daily. 5MG ON Tuesday 2.5 REST, Disp: , Rfl:     zolpidem (AMBIEN) 10 MG tablet, Take 1 tablet by mouth Every Night., Disp: 90 tablet, Rfl: 1     Allergies     No Known Allergies    Social History       Social History     Social History Narrative    Not on file       Immunizations     Immunization:  Immunization History   Administered Date(s) Administered    COVID-19 (MODERNA) 1st,2nd,3rd Dose Monovalent 02/27/2021, 03/27/2021    COVID-19 (MODERNA) Monovalent Original Booster 01/30/2022    Flu Vaccine Intradermal Quad 18-64YR 09/21/2012, 10/01/2020    Fluzone High-Dose 65+YRS 10/11/2013, 11/06/2017, 10/25/2024    Fluzone High-Dose 65+yrs 09/20/2022, 10/30/2023    Influenza Seasonal Injectable 09/21/2012    Pneumococcal Conjugate 13-Valent (PCV13) 06/27/2019    Pneumococcal Polysaccharide (PPSV23) 03/05/2011    Td (TDVAX) 08/17/2022          Objective     Objective     Vital Signs:   /80 (BP Location: Right arm, Patient Position: Sitting, Cuff Size: Adult)   Pulse 89   Temp 97.8 °F (36.6 °C) (Oral)   Ht 174 cm (68.5\")   Wt 96.2 kg (212 lb)   SpO2 97%   BMI 31.77 kg/m²       Physical Exam  Vitals and nursing note reviewed.   Constitutional:       Appearance: Normal appearance. He is obese.   HENT:      Head: Normocephalic.   Eyes:      Conjunctiva/sclera: Conjunctivae normal.      Pupils: Pupils are equal, round, and reactive to light.   Cardiovascular:      Rate and Rhythm: Normal rate and regular rhythm.      Pulses: Normal pulses.           Dorsalis pedis pulses are 2+ on the left side.      Heart sounds: Normal heart sounds.   Pulmonary:      Effort: Pulmonary effort is " normal.      Breath sounds: Normal breath sounds.   Abdominal:      General: Bowel sounds are normal.      Palpations: Abdomen is soft.   Musculoskeletal:         General: Normal range of motion.      Cervical back: Normal range of motion and neck supple.      Right lower le+ Edema present.      Left lower leg: Tenderness present. 2+ Pitting Edema present.      Left ankle: Tenderness present.      Comments: Lesion on anterior of left lower leg   Feet:      Comments: Ecchymosis on the medial aspect of the left ankle and foot   Skin:     General: Skin is warm and dry.   Neurological:      General: No focal deficit present.      Mental Status: He is alert and oriented to person, place, and time.   Psychiatric:         Attention and Perception: Attention normal.         Mood and Affect: Mood and affect normal.         Behavior: Behavior normal. Behavior is cooperative.         Physical Exam  Extremities: Left leg is swollen with tenderness noted.  Skin: Bruising noted on the left foot.      Results    The following data was reviewed by: YANG Hudson on 25             Protime-INR (2025 08:40)  CBC w AUTO Differential (2025 08:40)  CT Lower Extremity Left Without Contrast (2025 08:17)   Results           Assessment and Plan        Assessment and Plan       Pain in left lower leg    Orders:    CT Lower Extremity Left Without Contrast; Future    On warfarin therapy    Orders:    CBC w AUTO Differential; Future    Protime-INR; Future    CT Lower Extremity Left Without Contrast; Future         Assessment & Plan  1. Left leg pain.  - Reports hitting himself with a sledgehammer last week, resulting in significant swelling and pain in the left leg. Pain initially improved but then worsened, keeping him awake at night.  - Rates the pain as a 7 out of 10. Describes the pain as steady, with noticeable bruising and swelling. Range of motion is decreased.  - Suspected hematoma due to warfarin use,  causing bleeding under the skin. Laboratory tests will be conducted to assess current health status.  - Imaging of the left leg ordered to check for fluid accumulation or bleeding. Advised to keep the appointment with Dr. Verdin on 05/01/2025 for further evaluation.        Follow Up        Follow Up   Return for With PCP, Next scheduled follow up, sooner if condition worsens.  Patient was given instructions and counseling regarding his condition or for health maintenance advice. Please see specific information pulled into the AVS if appropriate.

## 2025-04-19 ENCOUNTER — PATIENT MESSAGE (OUTPATIENT)
Dept: FAMILY MEDICINE CLINIC | Age: 80
End: 2025-04-19
Payer: MEDICARE

## 2025-04-19 DIAGNOSIS — I10 ESSENTIAL HYPERTENSION: Primary | ICD-10-CM

## 2025-04-19 DIAGNOSIS — E78.2 MIXED HYPERLIPIDEMIA: ICD-10-CM

## 2025-04-19 DIAGNOSIS — R97.20 ELEVATED PSA: ICD-10-CM

## 2025-04-28 ENCOUNTER — LAB (OUTPATIENT)
Dept: LAB | Facility: HOSPITAL | Age: 80
End: 2025-04-28
Payer: MEDICARE

## 2025-04-28 DIAGNOSIS — I10 ESSENTIAL HYPERTENSION: ICD-10-CM

## 2025-04-28 DIAGNOSIS — E78.2 MIXED HYPERLIPIDEMIA: ICD-10-CM

## 2025-04-28 DIAGNOSIS — R97.20 ELEVATED PSA: ICD-10-CM

## 2025-04-28 LAB
ALBUMIN SERPL-MCNC: 4 G/DL (ref 3.5–5.2)
ALBUMIN/GLOB SERPL: 1.5 G/DL
ALP SERPL-CCNC: 62 U/L (ref 39–117)
ALT SERPL W P-5'-P-CCNC: 25 U/L (ref 1–41)
ANION GAP SERPL CALCULATED.3IONS-SCNC: 9.6 MMOL/L (ref 5–15)
AST SERPL-CCNC: 30 U/L (ref 1–40)
BILIRUB SERPL-MCNC: 0.7 MG/DL (ref 0–1.2)
BUN SERPL-MCNC: 15 MG/DL (ref 8–23)
BUN/CREAT SERPL: 14.2 (ref 7–25)
CALCIUM SPEC-SCNC: 9.6 MG/DL (ref 8.6–10.5)
CHLORIDE SERPL-SCNC: 103 MMOL/L (ref 98–107)
CO2 SERPL-SCNC: 26.4 MMOL/L (ref 22–29)
CREAT SERPL-MCNC: 1.06 MG/DL (ref 0.76–1.27)
EGFRCR SERPLBLD CKD-EPI 2021: 70.5 ML/MIN/1.73
GLOBULIN UR ELPH-MCNC: 2.7 GM/DL
GLUCOSE SERPL-MCNC: 109 MG/DL (ref 65–99)
POTASSIUM SERPL-SCNC: 3.6 MMOL/L (ref 3.5–5.2)
PROT SERPL-MCNC: 6.7 G/DL (ref 6–8.5)
PSA SERPL-MCNC: 9.14 NG/ML (ref 0–4)
SODIUM SERPL-SCNC: 139 MMOL/L (ref 136–145)

## 2025-04-28 PROCEDURE — 80053 COMPREHEN METABOLIC PANEL: CPT

## 2025-04-28 PROCEDURE — 36415 COLL VENOUS BLD VENIPUNCTURE: CPT

## 2025-04-28 PROCEDURE — 84153 ASSAY OF PSA TOTAL: CPT

## 2025-05-01 ENCOUNTER — OFFICE VISIT (OUTPATIENT)
Dept: FAMILY MEDICINE CLINIC | Age: 80
End: 2025-05-01
Payer: MEDICARE

## 2025-05-01 ENCOUNTER — HOSPITAL ENCOUNTER (OUTPATIENT)
Dept: GENERAL RADIOLOGY | Facility: HOSPITAL | Age: 80
Discharge: HOME | End: 2025-05-01
Admitting: FAMILY MEDICINE
Payer: MEDICARE

## 2025-05-01 VITALS
HEIGHT: 69 IN | BODY MASS INDEX: 30.9 KG/M2 | SYSTOLIC BLOOD PRESSURE: 135 MMHG | DIASTOLIC BLOOD PRESSURE: 67 MMHG | OXYGEN SATURATION: 97 % | HEART RATE: 88 BPM | WEIGHT: 208.6 LBS | TEMPERATURE: 97.6 F

## 2025-05-01 DIAGNOSIS — E78.2 MIXED HYPERLIPIDEMIA: ICD-10-CM

## 2025-05-01 DIAGNOSIS — I48.91 ATRIAL FIBRILLATION, UNSPECIFIED TYPE: ICD-10-CM

## 2025-05-01 DIAGNOSIS — F51.01 PRIMARY INSOMNIA: Primary | ICD-10-CM

## 2025-05-01 DIAGNOSIS — Z79.899 OTHER LONG TERM (CURRENT) DRUG THERAPY: ICD-10-CM

## 2025-05-01 DIAGNOSIS — I10 ESSENTIAL HYPERTENSION: ICD-10-CM

## 2025-05-01 DIAGNOSIS — S80.12XD HEMATOMA OF LEFT LOWER EXTREMITY, SUBSEQUENT ENCOUNTER: ICD-10-CM

## 2025-05-01 DIAGNOSIS — R97.20 ELEVATED PSA: ICD-10-CM

## 2025-05-01 LAB
AMPHET+METHAMPHET UR QL: NEGATIVE
AMPHETAMINES UR QL: NEGATIVE
BARBITURATES UR QL SCN: NEGATIVE
BENZODIAZ UR QL SCN: NEGATIVE
BUPRENORPHINE SERPL-MCNC: NEGATIVE NG/ML
CANNABINOIDS SERPL QL: NEGATIVE
COCAINE UR QL: NEGATIVE
EXPIRATION DATE: NORMAL
Lab: NORMAL
MDMA UR QL SCN: NEGATIVE
METHADONE UR QL SCN: NEGATIVE
MORPHINE/OPIATES SCREEN, URINE: NEGATIVE
OXYCODONE UR QL SCN: NEGATIVE
PCP UR QL SCN: NEGATIVE

## 2025-05-01 PROCEDURE — 73590 X-RAY EXAM OF LOWER LEG: CPT

## 2025-05-01 RX ORDER — HYDROCHLOROTHIAZIDE 25 MG/1
25 TABLET ORAL DAILY
Qty: 90 TABLET | Refills: 3 | Status: SHIPPED | OUTPATIENT
Start: 2025-05-01

## 2025-05-01 RX ORDER — HYDROCODONE BITARTRATE AND ACETAMINOPHEN 5; 325 MG/1; MG/1
1 TABLET ORAL EVERY 6 HOURS PRN
Qty: 24 TABLET | Refills: 0 | Status: SHIPPED | OUTPATIENT
Start: 2025-05-01

## 2025-05-01 RX ORDER — ZOLPIDEM TARTRATE 10 MG/1
10 TABLET ORAL NIGHTLY
Qty: 90 TABLET | Refills: 1 | Status: SHIPPED | OUTPATIENT
Start: 2025-05-01

## 2025-05-01 RX ORDER — AMLODIPINE BESYLATE 5 MG/1
5 TABLET ORAL DAILY
Qty: 90 TABLET | Refills: 3 | Status: SHIPPED | OUTPATIENT
Start: 2025-05-01

## 2025-05-01 RX ORDER — ROSUVASTATIN CALCIUM 20 MG/1
20 TABLET, COATED ORAL NIGHTLY
Qty: 90 TABLET | Refills: 3 | Status: SHIPPED | OUTPATIENT
Start: 2025-05-01

## 2025-05-01 NOTE — PROGRESS NOTES
Magnus Rousseau presents to [facility].    Chief Complaint:  Insomnia, blood pressure, cholesterol, leg hematoma    Subjective   History of Present Illness:  Magnus is being seen today for follow-up on his care.  He has significant insomnia for which he takes Ambien nightly.  He tolerates the medication well and continues to feel it is necessary.   In the past, we did lower the dose to 5 mg nightly.  However, it was not efficacious.  He is not aware of obvious side effects from the medication and denies any unusual sleep-related behaviors.  Dutch is reviewed.      He also has hypertension and elevated cholesterol.  He remains on treatment for these issues.  His blood pressure is in a good range today.  He checks his blood pressure intermittently at home, and it typically is in a good range there as well.  He tolerates the medications well.    Magnus injured his lower leg just above the ankle about 3 weeks ago.  He struck it accidentally with a sledgehammer.  He ended up going to the emergency department and had CT of the lower extremity that did not reveal obvious fracture.  However, he continues to have significant pain.  There is some degree of constant pain.  This pain is worsened by walking or activity.  It is challenging for him to deal with right now.  His pain level is 3-4/10 on a  scale at rest.  However, it will increase to 7-8/10 with activity.    Review of Systems:  Review of Systems   Constitutional:  Negative for chills and fever.   Respiratory:  Negative for cough and shortness of breath.    Cardiovascular:  Negative for chest pain and palpitations.   Gastrointestinal:  Negative for abdominal pain, nausea and vomiting.      Objective   Medical History:  Past Medical History:    Elevated prostate specific antigen (PSA)    Essential hypertension    Mixed hyperlipidemia    Other long term (current) drug therapy    Primary insomnia    Unspecified atrial fibrillation      Past Surgical History:    APPENDECTOMY    PACEMAKER IMPLANTATION    RECTAL FISTULA CLOSURE WITH FIBRIN GLUE      Family History   Problem Relation Age of Onset    Suicide Attempts Mother     Coronary artery disease Father     Cancer Brother         unspecified    Heart attack Brother      Social History     Tobacco Use    Smoking status: Never     Passive exposure: Past    Smokeless tobacco: Never   Substance Use Topics    Alcohol use: Yes     Alcohol/week: 1.0 standard drink of alcohol     Types: 1 Cans of beer per week     Comment: maybe 1 beer a night        Health Maintenance Due   Topic Date Due    ZOSTER VACCINE (1 of 2) Never done    RSV Vaccine - Adults (1 - 1-dose 75+ series) Never done        Immunization History   Administered Date(s) Administered    COVID-19 (MODERNA) 1st,2nd,3rd Dose Monovalent 02/27/2021, 03/27/2021    COVID-19 (MODERNA) Monovalent Original Booster 01/30/2022    Flu Vaccine Intradermal Quad 18-64YR 09/21/2012, 10/01/2020    Fluzone High-Dose 65+YRS 10/11/2013, 11/06/2017, 10/25/2024    Fluzone High-Dose 65+yrs 09/20/2022, 10/30/2023    Influenza Seasonal Injectable 09/21/2012    Pneumococcal Conjugate 13-Valent (PCV13) 06/27/2019    Pneumococcal Polysaccharide (PPSV23) 03/05/2011    Td (TDVAX) 08/17/2022       No Known Allergies     Medications:    Current Outpatient Medications:     amLODIPine (NORVASC) 5 MG tablet, Take 1 tablet by mouth Daily., Disp: 90 tablet, Rfl: 3    hydroCHLOROthiazide 25 MG tablet, Take 1 tablet by mouth Daily., Disp: 90 tablet, Rfl: 3    rosuvastatin (CRESTOR) 20 MG tablet, Take 1 tablet by mouth Every Night., Disp: 90 tablet, Rfl: 3    warfarin (COUMADIN) 2.5 MG tablet, Take 1 tablet by mouth Daily. 5MG ON Tuesday 2.5 REST, Disp: , Rfl:     warfarin (COUMADIN) 5 MG tablet, Take 1 tablet by mouth Daily. 5MG ON Tuesday 2.5 REST, Disp: , Rfl:     zolpidem (AMBIEN) 10 MG tablet, Take 1 tablet by mouth Every Night., Disp: 90 tablet, Rfl: 1     "HYDROcodone-acetaminophen (NORCO) 5-325 MG per tablet, Take 1 tablet by mouth Every 6 (Six) Hours As Needed for Moderate Pain or Severe Pain., Disp: 24 tablet, Rfl: 0    naloxone (NARCAN) 4 MG/0.1ML nasal spray, Administer 1 spray into the nostril(s) as directed by provider As Needed for Opioid Reversal., Disp: 2 each, Rfl: 2    Vital Signs:   /67 (BP Location: Right arm, Patient Position: Sitting)   Pulse 88   Temp 97.6 °F (36.4 °C) (Oral)   Ht 174 cm (68.5\")   Wt 94.6 kg (208 lb 9.6 oz)   SpO2 97%   BMI 31.25 kg/m²       Physical Exam:  Physical Exam  Vitals reviewed.   Constitutional:       General: He is not in acute distress.     Appearance: He is not ill-appearing.   Eyes:      Pupils: Pupils are equal, round, and reactive to light.   Neck:      Comments: No thyromegaly  Cardiovascular:      Rate and Rhythm: Normal rate and regular rhythm.   Pulmonary:      Effort: Pulmonary effort is normal.      Breath sounds: Normal breath sounds.   Abdominal:      General: There is no distension.      Palpations: Abdomen is soft.      Tenderness: There is no abdominal tenderness.   Musculoskeletal:      Cervical back: Neck supple.   Lymphadenopathy:      Cervical: No cervical adenopathy.   Skin:     Findings: No lesion or rash.   Neurological:      Mental Status: He is alert.     Result Review   The following data was reviewed by Marco Verdin MD on 05/01/2025.  Lab Results   Component Value Date    WBC 6.60 04/18/2025    HGB 16.0 04/18/2025    HCT 47.9 04/18/2025    MCV 95.2 04/18/2025     04/18/2025     Lab Results   Component Value Date    GLUCOSE 109 (H) 04/28/2025    BUN 15 04/28/2025    CREATININE 1.06 04/28/2025     04/28/2025    K 3.6 04/28/2025     04/28/2025    CALCIUM 9.6 04/28/2025    PROTEINTOT 6.7 04/28/2025    ALBUMIN 4.0 04/28/2025    ALT 25 04/28/2025    AST 30 04/28/2025    ALKPHOS 62 04/28/2025    BILITOT 0.7 04/28/2025    GLOB 2.7 04/28/2025    AGRATIO 1.5 " 04/28/2025    BCR 14.2 04/28/2025    ANIONGAP 9.6 04/28/2025    EGFR 70.5 04/28/2025     Lab Results   Component Value Date    CHOL 120 11/04/2024    CHLPL 130 11/23/2020    TRIG 114 11/04/2024    HDL 36 (L) 11/04/2024    LDL 63 11/04/2024     Lab Results   Component Value Date    TSH 1.580 11/04/2024     Lab Results   Component Value Date    HGBA1C 5.70 (H) 04/30/2024     Lab Results   Component Value Date    PSA 9.140 (H) 04/28/2025    PSA 10.500 (H) 02/06/2025    PSA 7.740 (H) 11/04/2024          Assessment and Plan:   Today, we have reviewed his care.  His problems are stable overall.  Regarding Ambien, his use of it is reasonable, and he continues to be of benefit.  His main concern today is actually the hematoma of the left lower extremity.  He is having significant pain that is impacting him on a daily basis.  We will give him another trial of hydrocodone.  We discussed potential risks of the medication including addiction, impairment, and overdose.  Urine tox and consent will be obtained today.  We will also send a prescription for Narcan with instruction.  He did not have difficulty with mental cloudiness or that type of problem when he recently took some from the emergency department.  We will also repeat x-rays of the shin as a precaution.  We will see how things progress.  We also reviewed the elevation of the PSA.  We discussed that there is potential for this to represent a smoldering prostate cancer.  The level has been relatively stable, and we mutually agreed just to continue to monitor things for now.  Tentative follow-up with me will be again in about 6 months, sooner if needed.    Diagnoses and all orders for this visit:    1. Primary insomnia (Primary)  -     zolpidem (AMBIEN) 10 MG tablet; Take 1 tablet by mouth Every Night.  Dispense: 90 tablet; Refill: 1    2. Essential hypertension  -     amLODIPine (NORVASC) 5 MG tablet; Take 1 tablet by mouth Daily.  Dispense: 90 tablet; Refill: 3  -      hydroCHLOROthiazide 25 MG tablet; Take 1 tablet by mouth Daily.  Dispense: 90 tablet; Refill: 3    3. Mixed hyperlipidemia  -     rosuvastatin (CRESTOR) 20 MG tablet; Take 1 tablet by mouth Every Night.  Dispense: 90 tablet; Refill: 3    4. Atrial fibrillation, unspecified type  Comments:  As above.    5. Hematoma of left lower extremity, subsequent encounter  -     XR Tibia Fibula 2 View Left; Future  -     HYDROcodone-acetaminophen (NORCO) 5-325 MG per tablet; Take 1 tablet by mouth Every 6 (Six) Hours As Needed for Moderate Pain or Severe Pain.  Dispense: 24 tablet; Refill: 0    6. Other long term (current) drug therapy  -     zolpidem (AMBIEN) 10 MG tablet; Take 1 tablet by mouth Every Night.  Dispense: 90 tablet; Refill: 1  -     POC Medline 12 Panel Urine Drug Screen  -     naloxone (NARCAN) 4 MG/0.1ML nasal spray; Administer 1 spray into the nostril(s) as directed by provider As Needed for Opioid Reversal.  Dispense: 2 each; Refill: 2    7. Elevated PSA  Comments:  As above.    Follow Up  Return in about 27 weeks (around 11/6/2025) for Recheck, Next scheduled follow up.  Patient was given instructions and counseling regarding his condition or for health maintenance advice. Please see specific information pulled into the AVS if appropriate.

## 2025-06-09 ENCOUNTER — OFFICE VISIT (OUTPATIENT)
Dept: FAMILY MEDICINE CLINIC | Age: 80
End: 2025-06-09
Payer: MEDICARE

## 2025-06-09 VITALS
WEIGHT: 205 LBS | HEIGHT: 69 IN | BODY MASS INDEX: 30.36 KG/M2 | DIASTOLIC BLOOD PRESSURE: 70 MMHG | HEART RATE: 78 BPM | TEMPERATURE: 97.7 F | SYSTOLIC BLOOD PRESSURE: 117 MMHG | OXYGEN SATURATION: 97 %

## 2025-06-09 DIAGNOSIS — J01.00 ACUTE NON-RECURRENT MAXILLARY SINUSITIS: Primary | ICD-10-CM

## 2025-06-09 PROCEDURE — 3074F SYST BP LT 130 MM HG: CPT | Performed by: NURSE PRACTITIONER

## 2025-06-09 PROCEDURE — 99213 OFFICE O/P EST LOW 20 MIN: CPT | Performed by: NURSE PRACTITIONER

## 2025-06-09 PROCEDURE — 3078F DIAST BP <80 MM HG: CPT | Performed by: NURSE PRACTITIONER

## 2025-06-09 PROCEDURE — 1126F AMNT PAIN NOTED NONE PRSNT: CPT | Performed by: NURSE PRACTITIONER

## 2025-06-09 RX ORDER — GUAIFENESIN 600 MG/1
600 TABLET, EXTENDED RELEASE ORAL 2 TIMES DAILY
Qty: 20 TABLET | Refills: 0 | Status: SHIPPED | OUTPATIENT
Start: 2025-06-09

## 2025-06-09 RX ORDER — DEXTROMETHORPHAN HYDROBROMIDE AND PROMETHAZINE HYDROCHLORIDE 15; 6.25 MG/5ML; MG/5ML
5 SYRUP ORAL 4 TIMES DAILY PRN
Qty: 240 ML | Refills: 0 | Status: SHIPPED | OUTPATIENT
Start: 2025-06-09

## 2025-06-09 NOTE — PROGRESS NOTES
Chief Complaint  Cough (Cough, sneezing ) and Nasal Congestion (Going on since 6/1/25)    Subjective        Magnus Rousseau presents to [facility] today for       History of Present Illness  The patient is an 80-year-old male here for follow-up on cough and cold symptoms since 06/01/2025, which is now 8 days.    He has been experiencing a common cold for the past week, characterized by significant coughing, sneezing, and nasal discharge. He reports no fever but does experience a mild sore throat due to the persistent coughing. Additionally, he notes a slight headache when the coughing intensifies. His cough is minimally productive, and he describes a sensation of an object lodged in his throat that he is unable to expel. He reports no ear pain, but does have sinus pressure pain. He has been self-isolating from his wife due to his illness. He recalls a severe coughing episode last night that left him feeling faint. He has been managing his symptoms with Mucinex DM 12. He has previously been prescribed amoxicillin and cephalexin, both of which have been effective.          Current Outpatient Medications:     amLODIPine (NORVASC) 5 MG tablet, Take 1 tablet by mouth Daily., Disp: 90 tablet, Rfl: 3    hydroCHLOROthiazide 25 MG tablet, Take 1 tablet by mouth Daily., Disp: 90 tablet, Rfl: 3    HYDROcodone-acetaminophen (NORCO) 5-325 MG per tablet, Take 1 tablet by mouth Every 6 (Six) Hours As Needed for Moderate Pain or Severe Pain., Disp: 24 tablet, Rfl: 0    naloxone (NARCAN) 4 MG/0.1ML nasal spray, Administer 1 spray into the nostril(s) as directed by provider As Needed for Opioid Reversal., Disp: 2 each, Rfl: 2    rosuvastatin (CRESTOR) 20 MG tablet, Take 1 tablet by mouth Every Night., Disp: 90 tablet, Rfl: 3    warfarin (COUMADIN) 2.5 MG tablet, Take 1 tablet by mouth Daily. 5MG ON Tuesday 2.5 REST, Disp: , Rfl:     warfarin (COUMADIN) 5 MG tablet, Take 1 tablet by mouth Daily. 5MG ON  "Tuesday 2.5 REST, Disp: , Rfl:     zolpidem (AMBIEN) 10 MG tablet, Take 1 tablet by mouth Every Night., Disp: 90 tablet, Rfl: 1    amoxicillin-clavulanate (AUGMENTIN) 875-125 MG per tablet, Take 1 tablet by mouth 2 (Two) Times a Day for 10 days., Disp: 20 tablet, Rfl: 0    guaiFENesin (Mucinex) 600 MG 12 hr tablet, Take 1 tablet by mouth 2 (Two) Times a Day., Disp: 20 tablet, Rfl: 0    promethazine-dextromethorphan (PROMETHAZINE-DM) 6.25-15 MG/5ML syrup, Take 5 mL by mouth 4 (Four) Times a Day As Needed for Cough., Disp: 240 mL, Rfl: 0  There are no discontinued medications.      Allergies:  Patient has no known allergies.      Objective   Vital Signs:   Vitals:    06/09/25 1111   BP: 117/70   BP Location: Left arm   Patient Position: Sitting   Cuff Size: Adult   Pulse: 78   Temp: 97.7 °F (36.5 °C)   TempSrc: Oral   SpO2: 97%   Weight: 93 kg (205 lb)   Height: 174 cm (68.5\")     Body mass index is 30.71 kg/m².           Physical Exam  Constitutional:       Appearance: Normal appearance.   HENT:      Right Ear: Tympanic membrane normal.      Left Ear: Tympanic membrane normal.      Nose: Congestion present.      Right Sinus: Frontal sinus tenderness present.      Left Sinus: Maxillary sinus tenderness present.      Mouth/Throat:      Pharynx: Posterior oropharyngeal erythema present. No oropharyngeal exudate.   Neck:      Vascular: No carotid bruit.   Cardiovascular:      Rate and Rhythm: Normal rate and regular rhythm.      Heart sounds: Normal heart sounds.   Pulmonary:      Effort: Pulmonary effort is normal.      Breath sounds: Normal breath sounds.   Musculoskeletal:         General: Normal range of motion.   Skin:     General: Skin is warm and dry.   Neurological:      General: No focal deficit present.      Mental Status: He is alert.   Psychiatric:         Mood and Affect: Mood normal.         Behavior: Behavior normal.             Lab Results   Component Value Date    GLUCOSE 109 (H) 04/28/2025    BUN 15 " 04/28/2025    CREATININE 1.06 04/28/2025    EGFRIFNONA 66 10/26/2021    BCR 14.2 04/28/2025    K 3.6 04/28/2025    CO2 26.4 04/28/2025    CALCIUM 9.6 04/28/2025    ALBUMIN 4.0 04/28/2025    AST 30 04/28/2025    ALT 25 04/28/2025       Lab Results   Component Value Date    CHOL 120 11/04/2024    TRIG 114 11/04/2024    HDL 36 (L) 11/04/2024    LDL 63 11/04/2024       Lab Results   Component Value Date    WBC 6.60 04/18/2025    HGB 16.0 04/18/2025    HCT 47.9 04/18/2025    MCV 95.2 04/18/2025     04/18/2025                     Procedures         Diagnoses and all orders for this visit:    1. Acute non-recurrent maxillary sinusitis (Primary)  -     guaiFENesin (Mucinex) 600 MG 12 hr tablet; Take 1 tablet by mouth 2 (Two) Times a Day.  Dispense: 20 tablet; Refill: 0  -     amoxicillin-clavulanate (AUGMENTIN) 875-125 MG per tablet; Take 1 tablet by mouth 2 (Two) Times a Day for 10 days.  Dispense: 20 tablet; Refill: 0  -     promethazine-dextromethorphan (PROMETHAZINE-DM) 6.25-15 MG/5ML syrup; Take 5 mL by mouth 4 (Four) Times a Day As Needed for Cough.  Dispense: 240 mL; Refill: 0          Assessment & Plan  1. Cough and cold symptoms.  - Symptoms include coughing, sneezing, and a sore throat from coughing since 06/01/2025. No fever, ear pain, or significant headache except when coughing.  - Physical exam reveals clear lungs and redness in the throat without pus. Symptoms are primarily sinus-related.  - Discussed the condition and reviewed the patient's history of using amoxicillin and cephalexin. The patient prefers amoxicillin.  - Prescribed amoxicillin 500 mg to be taken twice daily for 10 days, a decongestant, and a cough syrup containing promethazine to be taken every 4 hours as needed for the cough. Advised to contact the office if there is no improvement.              Follow Up  No follow-ups on file.  Patient was given instructions and counseling regarding his condition or for health maintenance advice.  Please see specific information pulled into the AVS if appropriate.           YANG Winter  06/09/2025    Please note that portions of this document were completed using a voice recognition program.     Patient or patient representative verbalized consent for the use of Ambient Listening during the visit with  YANG Winter for chart documentation. 6/9/2025  11:12 EDT

## 2025-06-17 ENCOUNTER — OFFICE VISIT (OUTPATIENT)
Dept: FAMILY MEDICINE CLINIC | Age: 80
End: 2025-06-17
Payer: MEDICARE

## 2025-06-17 VITALS
OXYGEN SATURATION: 98 % | TEMPERATURE: 97.9 F | WEIGHT: 203.4 LBS | BODY MASS INDEX: 30.13 KG/M2 | SYSTOLIC BLOOD PRESSURE: 123 MMHG | DIASTOLIC BLOOD PRESSURE: 81 MMHG | HEART RATE: 78 BPM | HEIGHT: 69 IN

## 2025-06-17 DIAGNOSIS — R05.1 ACUTE COUGH: ICD-10-CM

## 2025-06-17 DIAGNOSIS — R09.81 NASAL CONGESTION: ICD-10-CM

## 2025-06-17 DIAGNOSIS — J30.2 SEASONAL ALLERGIC RHINITIS, UNSPECIFIED TRIGGER: Primary | ICD-10-CM

## 2025-06-17 PROCEDURE — 1126F AMNT PAIN NOTED NONE PRSNT: CPT | Performed by: STUDENT IN AN ORGANIZED HEALTH CARE EDUCATION/TRAINING PROGRAM

## 2025-06-17 PROCEDURE — 3074F SYST BP LT 130 MM HG: CPT | Performed by: STUDENT IN AN ORGANIZED HEALTH CARE EDUCATION/TRAINING PROGRAM

## 2025-06-17 PROCEDURE — 3079F DIAST BP 80-89 MM HG: CPT | Performed by: STUDENT IN AN ORGANIZED HEALTH CARE EDUCATION/TRAINING PROGRAM

## 2025-06-17 PROCEDURE — 99213 OFFICE O/P EST LOW 20 MIN: CPT | Performed by: STUDENT IN AN ORGANIZED HEALTH CARE EDUCATION/TRAINING PROGRAM

## 2025-06-17 PROCEDURE — 1159F MED LIST DOCD IN RCRD: CPT | Performed by: STUDENT IN AN ORGANIZED HEALTH CARE EDUCATION/TRAINING PROGRAM

## 2025-06-17 PROCEDURE — 1160F RVW MEDS BY RX/DR IN RCRD: CPT | Performed by: STUDENT IN AN ORGANIZED HEALTH CARE EDUCATION/TRAINING PROGRAM

## 2025-06-17 RX ORDER — GUAIFENESIN 600 MG/1
600 TABLET, EXTENDED RELEASE ORAL 2 TIMES DAILY
Qty: 20 TABLET | Refills: 0 | Status: SHIPPED | OUTPATIENT
Start: 2025-06-17

## 2025-06-17 RX ORDER — FLUTICASONE PROPIONATE 50 MCG
1 SPRAY, SUSPENSION (ML) NASAL 2 TIMES DAILY
Qty: 16 G | Refills: 0 | Status: SHIPPED | OUTPATIENT
Start: 2025-06-17

## 2025-06-17 RX ORDER — METHYLPREDNISOLONE 4 MG/1
TABLET ORAL
Qty: 21 TABLET | Refills: 0 | Status: SHIPPED | OUTPATIENT
Start: 2025-06-17

## 2025-06-17 NOTE — PROGRESS NOTES
Chief Complaint     Cough (Patient stopped taking augmentin due to diarrhea)    History of Present Illness     Magnus Rousseau is a 80 y.o. male who presents to [facility].     Patient or patient representative verbalized consent for the use of Ambient Listening during the visit with  YANG Hudson for chart documentation. 6/17/2025  12:33 EDT      History of Present Illness  The patient is an 80-year-old male who presents for evaluation of a persistent cough.    He has been experiencing symptoms of a cold, including a cough, fatigue, and nasal congestion, for the past 3 weeks. He reports no sinus pressure, headaches, fever, chills, body aches, nausea, vomiting, sore throat, shortness of breath, or chest pain. He has not been taking any antihistamines such as Zyrtec or Claritin. His cough does not interfere with his daily activities or sleep, although he occasionally experiences coughing spells that last for about a minute, during which he produces white sputum. He continues to take Mucinex and cough syrup as prescribed. He had a runny nose last week, but this symptom has since resolved.     Despite being prescribed an antibiotic, cold syrup, and cough medicine, he reports no improvement in his condition. The antibiotic, Augmentin has been causing diarrhea, leading him to discontinue its use on 06/14/2025. He notes that he has previously taken amoxicillin without experiencing this side effect. He has no history of allergies.         History      Past Medical History:   Diagnosis Date    Elevated prostate specific antigen (PSA)     Essential hypertension     Mixed hyperlipidemia     Other long term (current) drug therapy     Primary insomnia     Unspecified atrial fibrillation        Past Surgical History:   Procedure Laterality Date    APPENDECTOMY      PACEMAKER IMPLANTATION      RECTAL FISTULA CLOSURE WITH FIBRIN GLUE         Family History   Problem Relation Age of Onset     Suicide Attempts Mother     Coronary artery disease Father     Cancer Brother         unspecified    Heart attack Brother         Current Medications        Current Outpatient Medications:     amLODIPine (NORVASC) 5 MG tablet, Take 1 tablet by mouth Daily., Disp: 90 tablet, Rfl: 3    guaiFENesin (Mucinex) 600 MG 12 hr tablet, Take 1 tablet by mouth 2 (Two) Times a Day., Disp: 20 tablet, Rfl: 0    hydroCHLOROthiazide 25 MG tablet, Take 1 tablet by mouth Daily., Disp: 90 tablet, Rfl: 3    HYDROcodone-acetaminophen (NORCO) 5-325 MG per tablet, Take 1 tablet by mouth Every 6 (Six) Hours As Needed for Moderate Pain or Severe Pain., Disp: 24 tablet, Rfl: 0    naloxone (NARCAN) 4 MG/0.1ML nasal spray, Administer 1 spray into the nostril(s) as directed by provider As Needed for Opioid Reversal., Disp: 2 each, Rfl: 2    promethazine-dextromethorphan (PROMETHAZINE-DM) 6.25-15 MG/5ML syrup, Take 5 mL by mouth 4 (Four) Times a Day As Needed for Cough., Disp: 240 mL, Rfl: 0    rosuvastatin (CRESTOR) 20 MG tablet, Take 1 tablet by mouth Every Night., Disp: 90 tablet, Rfl: 3    warfarin (COUMADIN) 2.5 MG tablet, Take 1 tablet by mouth Daily. 5MG ON Tuesday 2.5 REST, Disp: , Rfl:     warfarin (COUMADIN) 5 MG tablet, Take 1 tablet by mouth Daily. 5MG ON Tuesday 2.5 REST, Disp: , Rfl:     zolpidem (AMBIEN) 10 MG tablet, Take 1 tablet by mouth Every Night., Disp: 90 tablet, Rfl: 1    fluticasone (FLONASE) 50 MCG/ACT nasal spray, Administer 1 spray into the nostril(s) as directed by provider 2 (Two) Times a Day., Disp: 16 g, Rfl: 0    methylPREDNISolone (MEDROL) 4 MG dose pack, Take as directed on package instructions., Disp: 21 tablet, Rfl: 0     Allergies     No Known Allergies    Social History       Social History     Social History Narrative    Not on file       Immunizations     Immunization:  Immunization History   Administered Date(s) Administered    COVID-19 (MODERNA) 1st,2nd,3rd Dose Monovalent 02/27/2021, 03/27/2021     "COVID-19 (MODERNA) Monovalent Original Booster 01/30/2022    Flu Vaccine Intradermal Quad 18-64YR 09/21/2012, 10/01/2020    Fluzone High-Dose 65+YRS 10/11/2013, 11/06/2017, 10/25/2024    Fluzone High-Dose 65+yrs 09/20/2022, 10/30/2023    Influenza Seasonal Injectable 09/21/2012    Pneumococcal Conjugate 13-Valent (PCV13) 06/27/2019    Pneumococcal Polysaccharide (PPSV23) 03/05/2011    Td (TDVAX) 08/17/2022          Objective     Objective     Vital Signs:   /81 (BP Location: Left arm, Patient Position: Sitting)   Pulse 78   Temp 97.9 °F (36.6 °C) (Oral)   Ht 174 cm (68.5\")   Wt 92.3 kg (203 lb 6.4 oz)   SpO2 98% Comment: on RA  BMI 30.48 kg/m²       Physical Exam  Vitals and nursing note reviewed.   Constitutional:       Appearance: Normal appearance. He is obese.   HENT:      Head: Normocephalic.      Right Ear: Tympanic membrane, ear canal and external ear normal.      Left Ear: Tympanic membrane, ear canal and external ear normal.      Nose: Congestion present.      Mouth/Throat:      Lips: Pink.      Mouth: Mucous membranes are moist.      Pharynx: Oropharynx is clear. Uvula midline.   Eyes:      Conjunctiva/sclera: Conjunctivae normal.      Pupils: Pupils are equal, round, and reactive to light.   Cardiovascular:      Rate and Rhythm: Normal rate and regular rhythm.      Pulses: Normal pulses.      Heart sounds: Normal heart sounds.   Pulmonary:      Effort: Pulmonary effort is normal.      Breath sounds: Normal breath sounds.      Comments: Dry cough present  Abdominal:      General: Bowel sounds are normal.      Palpations: Abdomen is soft.   Musculoskeletal:         General: Normal range of motion.      Cervical back: Normal range of motion and neck supple.   Lymphadenopathy:      Cervical: No cervical adenopathy.   Skin:     General: Skin is warm and dry.   Neurological:      General: No focal deficit present.      Mental Status: He is alert and oriented to person, place, and time. "   Psychiatric:         Attention and Perception: Attention normal.         Mood and Affect: Mood and affect normal.         Behavior: Behavior normal. Behavior is cooperative.         Physical Exam  Ears: External ear canals and tympanic membranes intact  Mouth/Throat: Mucous membranes moist, no erythema, no exudate  Respiratory: Clear to auscultation, no wheezing, rales or rhonchi  Cardiovascular: Regular rate and rhythm, no murmurs, rubs, or gallops      Results    The following data was reviewed by: YANG Hudson on 06/17/25               Results           Assessment and Plan        Assessment and Plan       Seasonal allergic rhinitis, unspecified trigger    Orders:    fluticasone (FLONASE) 50 MCG/ACT nasal spray; Administer 1 spray into the nostril(s) as directed by provider 2 (Two) Times a Day.    methylPREDNISolone (MEDROL) 4 MG dose pack; Take as directed on package instructions.    Nasal congestion    Orders:    fluticasone (FLONASE) 50 MCG/ACT nasal spray; Administer 1 spray into the nostril(s) as directed by provider 2 (Two) Times a Day.    methylPREDNISolone (MEDROL) 4 MG dose pack; Take as directed on package instructions.    Acute cough    Orders:    fluticasone (FLONASE) 50 MCG/ACT nasal spray; Administer 1 spray into the nostril(s) as directed by provider 2 (Two) Times a Day.    methylPREDNISolone (MEDROL) 4 MG dose pack; Take as directed on package instructions.    guaiFENesin (Mucinex) 600 MG 12 hr tablet; Take 1 tablet by mouth 2 (Two) Times a Day.         Assessment & Plan  1. Cough.  - Symptoms do not suggest an infection but rather point towards allergies. The cough could be attributed to postnasal drainage.  - Lungs are clear. Heart sounds are normal.  - Advised to maintain adequate hydration. If diarrhea persists, consider over-the-counter probiotics.  - Prescriptions provided for Flonase nasal spray, Medrol Dosepak, and Mucinex. Continue cough syrup at bedtime to aid sleep. Contact us  if symptoms worsen or fail to improve after completing the prescribed medications.    Follow-up  The patient will follow up in November with Dr. Verdin.        Follow Up        Follow Up   Return for With PCP, Next scheduled follow up, sooner if condition worsens.  Patient was given instructions and counseling regarding his condition or for health maintenance advice. Please see specific information pulled into the AVS if appropriate.

## 2025-06-30 ENCOUNTER — OFFICE VISIT (OUTPATIENT)
Dept: FAMILY MEDICINE CLINIC | Age: 80
End: 2025-06-30
Payer: MEDICARE

## 2025-06-30 VITALS
HEART RATE: 64 BPM | WEIGHT: 201.8 LBS | BODY MASS INDEX: 29.89 KG/M2 | RESPIRATION RATE: 18 BRPM | SYSTOLIC BLOOD PRESSURE: 119 MMHG | OXYGEN SATURATION: 97 % | TEMPERATURE: 97.8 F | DIASTOLIC BLOOD PRESSURE: 80 MMHG | HEIGHT: 69 IN

## 2025-06-30 DIAGNOSIS — R31.9 HEMATURIA, UNSPECIFIED TYPE: Primary | ICD-10-CM

## 2025-06-30 LAB
BACTERIA UR QL AUTO: NORMAL /HPF
BILIRUB UR QL STRIP: NEGATIVE
CLARITY UR: CLEAR
COLOR UR: YELLOW
GLUCOSE UR STRIP-MCNC: NEGATIVE MG/DL
HGB UR QL STRIP.AUTO: ABNORMAL
KETONES UR QL STRIP: NEGATIVE
LEUKOCYTE ESTERASE UR QL STRIP.AUTO: NEGATIVE
NITRITE UR QL STRIP: NEGATIVE
PH UR STRIP.AUTO: 7 [PH] (ref 5–8)
PROT UR QL STRIP: NEGATIVE
RBC # UR STRIP: NORMAL /HPF
REF LAB TEST METHOD: NORMAL
SP GR UR STRIP: 1.02 (ref 1–1.03)
SQUAMOUS #/AREA URNS HPF: NORMAL /HPF
UROBILINOGEN UR QL STRIP: ABNORMAL
WBC # UR STRIP: NORMAL /HPF

## 2025-06-30 PROCEDURE — 3074F SYST BP LT 130 MM HG: CPT | Performed by: STUDENT IN AN ORGANIZED HEALTH CARE EDUCATION/TRAINING PROGRAM

## 2025-06-30 PROCEDURE — 81001 URINALYSIS AUTO W/SCOPE: CPT | Performed by: STUDENT IN AN ORGANIZED HEALTH CARE EDUCATION/TRAINING PROGRAM

## 2025-06-30 PROCEDURE — 1126F AMNT PAIN NOTED NONE PRSNT: CPT | Performed by: STUDENT IN AN ORGANIZED HEALTH CARE EDUCATION/TRAINING PROGRAM

## 2025-06-30 PROCEDURE — 1159F MED LIST DOCD IN RCRD: CPT | Performed by: STUDENT IN AN ORGANIZED HEALTH CARE EDUCATION/TRAINING PROGRAM

## 2025-06-30 PROCEDURE — 3079F DIAST BP 80-89 MM HG: CPT | Performed by: STUDENT IN AN ORGANIZED HEALTH CARE EDUCATION/TRAINING PROGRAM

## 2025-06-30 PROCEDURE — 1160F RVW MEDS BY RX/DR IN RCRD: CPT | Performed by: STUDENT IN AN ORGANIZED HEALTH CARE EDUCATION/TRAINING PROGRAM

## 2025-06-30 PROCEDURE — 99213 OFFICE O/P EST LOW 20 MIN: CPT | Performed by: STUDENT IN AN ORGANIZED HEALTH CARE EDUCATION/TRAINING PROGRAM

## 2025-06-30 NOTE — PROGRESS NOTES
Chief Complaint     Blood in Urine (2 days ago) and Dysuria    History of Present Illness     Magnus Rousseau is a 80 y.o. male who presents to [facility].     Patient or patient representative verbalized consent for the use of Ambient Listening during the visit with  YANG Hudson for chart documentation. 6/30/2025  11:02 EDT      History of Present Illness  The patient is an 80-year-old male who presents for evaluation of blood in his urine.    He first observed hematuria on Saturday around noon, which recurred yesterday. However, he has not noticed any further signs since approximately 9:00 PM last night. He reports no pain during urination or discomfort in the lower abdomen or back. His urinary frequency remains high, with a slight increase noted recently. He only had to urinate twice the previous night. He experiences urgency when the need to urinate arises. He is uncertain if his elevated PSA levels could be contributing to the hematuria. His urine appeared normal in color today.    He recalls being prescribed Augmentin 3 weeks ago, which caused gastrointestinal upset. He has a history of kidney stones, with the most recent episode occurring at least 15 years ago in [place]. He has been hospitalized 3 times due to kidney stones.    He is currently on warfarin therapy and has an appointment tomorrow for an INR check. His last INR reading was 4.7, which was attributed to antibiotic use. He was advised to reduce his warfarin dosage from 5 mg to 2.5 mg. He has been on warfarin since 1999 following a pacemaker implantation and has maintained consistent blood checks.    He had a hematoma on his leg after he hit himself. He went back to see Dr. Verdin a couple of days later and was given pain medication, which did not help much. The swelling has finally gone down and is back to normal. He could not sleep in the same bed with his wife because his dog sleeps there and  anything that touched it would cause severe pain. He slept out in his recliner for about a month until it got unsensitive. About 2 or 3 weeks ago, he was out in the yard and stomped his foot really hard and felt something move down. A couple of days later, the leg was fine.         History      Past Medical History:   Diagnosis Date    Elevated prostate specific antigen (PSA)     Essential hypertension     Mixed hyperlipidemia     Other long term (current) drug therapy     Primary insomnia     Unspecified atrial fibrillation        Past Surgical History:   Procedure Laterality Date    APPENDECTOMY      PACEMAKER IMPLANTATION      RECTAL FISTULA CLOSURE WITH FIBRIN GLUE         Family History   Problem Relation Age of Onset    Suicide Attempts Mother     Coronary artery disease Father     Cancer Brother         unspecified    Heart attack Brother         Current Medications        Current Outpatient Medications:     amLODIPine (NORVASC) 5 MG tablet, Take 1 tablet by mouth Daily., Disp: 90 tablet, Rfl: 3    hydroCHLOROthiazide 25 MG tablet, Take 1 tablet by mouth Daily., Disp: 90 tablet, Rfl: 3    rosuvastatin (CRESTOR) 20 MG tablet, Take 1 tablet by mouth Every Night., Disp: 90 tablet, Rfl: 3    warfarin (COUMADIN) 2.5 MG tablet, Take 1 tablet by mouth Daily. 5MG ON Tuesday 2.5 REST, Disp: , Rfl:     warfarin (COUMADIN) 5 MG tablet, Take 1 tablet by mouth Daily. 5MG ON Tuesday 2.5 REST, Disp: , Rfl:     zolpidem (AMBIEN) 10 MG tablet, Take 1 tablet by mouth Every Night., Disp: 90 tablet, Rfl: 1    HYDROcodone-acetaminophen (NORCO) 5-325 MG per tablet, Take 1 tablet by mouth Every 6 (Six) Hours As Needed for Moderate Pain or Severe Pain. (Patient not taking: Reported on 6/30/2025), Disp: 24 tablet, Rfl: 0     Allergies     No Known Allergies    Social History       Social History     Social History Narrative    Not on file       Immunizations     Immunization:  Immunization History   Administered Date(s) Administered  "   COVID-19 (MODERNA) 1st,2nd,3rd Dose Monovalent 02/27/2021, 03/27/2021    COVID-19 (MODERNA) Monovalent Original Booster 01/30/2022    Flu Vaccine Intradermal Quad 18-64YR 09/21/2012, 10/01/2020    Fluzone High-Dose 65+YRS 10/11/2013, 11/06/2017, 10/25/2024    Fluzone High-Dose 65+yrs 09/20/2022, 10/30/2023    Influenza Seasonal Injectable 09/21/2012    Pneumococcal Conjugate 13-Valent (PCV13) 06/27/2019    Pneumococcal Polysaccharide (PPSV23) 03/05/2011    Td (TDVAX) 08/17/2022          Objective     Objective     Vital Signs:   /80 (BP Location: Left arm, Patient Position: Sitting, Cuff Size: Adult)   Pulse 64   Temp 97.8 °F (36.6 °C) (Oral)   Resp 18   Ht 174 cm (68.5\")   Wt 91.5 kg (201 lb 12.8 oz)   SpO2 97% Comment: RA  BMI 30.23 kg/m²       Physical Exam  Vitals and nursing note reviewed.   Constitutional:       Appearance: Normal appearance. He is obese.   HENT:      Head: Normocephalic.   Eyes:      Conjunctiva/sclera: Conjunctivae normal.      Pupils: Pupils are equal, round, and reactive to light.   Cardiovascular:      Rate and Rhythm: Normal rate and regular rhythm.      Pulses: Normal pulses.      Heart sounds: Normal heart sounds.   Pulmonary:      Effort: Pulmonary effort is normal.      Breath sounds: Normal breath sounds.   Abdominal:      General: Bowel sounds are normal.      Palpations: Abdomen is soft.   Musculoskeletal:         General: Normal range of motion.      Cervical back: Normal range of motion and neck supple.   Skin:     General: Skin is warm and dry.   Neurological:      General: No focal deficit present.      Mental Status: He is alert and oriented to person, place, and time.   Psychiatric:         Attention and Perception: Attention normal.         Mood and Affect: Mood and affect normal.         Behavior: Behavior normal. Behavior is cooperative.         Physical Exam  Respiratory: Clear to auscultation, no wheezing, rales or rhonchi      Results    The following " data was reviewed by: YANG Hudson on 06/30/25             Urinalysis, Microscopic Only - Urine, Clean Catch (06/30/2025 10:48)  Urinalysis With Culture If Indicated - Urine, Clean Catch (06/30/2025 10:48)  Results  Urinalysis showed trace amount of blood.  Microscopic urinalysis was within normal limits.       Assessment and Plan        Assessment and Plan       Hematuria, unspecified type    Orders:    Urinalysis With Culture If Indicated - Urine, Clean Catch    Urinalysis With Microscopic - Urine, Clean Catch; Future    Urinalysis, Microscopic Only - Urine, Clean Catch      Assessment & Plan  1. Hematuria.  - He reported noticing blood in his urine starting Saturday and again on Sunday, but no signs since 9:00 PM last night.  - There is no associated pain, lower abdomen pain, or back pain. Given his history of kidney stones, it is unlikely to be the cause due to the absence of pain.  - The urine test showed no bacteria but a trace amount of blood. The hematuria may be related to his current warfarin therapy.  - He is advised to monitor for any recurrence of hematuria and to seek immediate medical attention at the emergency room if he observes significant blood in his urine.    2. Elevated PSA.  - His PSA levels, previously checked by Dr. Verdin, remain elevated but have slightly decreased.  - He is scheduled for a follow-up with Dr. Verdin in November to reassess his PSA levels.  - If he has any concerns before then, he is advised to consult with Dr. Verdin.    3. Warfarin management.  - He is currently on warfarin and has an INR check scheduled for tomorrow.  - His last INR reading was 4.7, which was attributed to antibiotic use. He was advised to reduce his warfarin dosage from 5 mg to 2.5 mg.  - He should inform his cardiologist about the hematuria as it could be related to his warfarin therapy.  - He is advised to continue monitoring his INR levels and to be cautious with foods and medications that  can affect warfarin's effectiveness.    4. Resolved hematoma.  - He had a hematoma on his leg after he hit himself.  - He went back to see Dr. Verdin a couple of days later and was given pain medication, which did not help much.  - The swelling has finally gone down and is back to normal.          Follow Up        Follow Up   Return for With PCP, Next scheduled follow up, sooner if condition worsens.  Patient was given instructions and counseling regarding his condition or for health maintenance advice. Please see specific information pulled into the AVS if appropriate.

## 2025-08-01 ENCOUNTER — TELEPHONE (OUTPATIENT)
Dept: FAMILY MEDICINE CLINIC | Age: 80
End: 2025-08-01
Payer: MEDICARE

## 2025-08-01 DIAGNOSIS — R97.20 ELEVATED PSA: Primary | ICD-10-CM

## 2025-08-01 NOTE — TELEPHONE ENCOUNTER
----- Message from Amber NAIR sent at 5/1/2025 11:01 AM EDT -----  TICKLE for diagnostic PSA in 90 days for elevated PSA.

## 2025-08-05 ENCOUNTER — LAB (OUTPATIENT)
Dept: LAB | Facility: HOSPITAL | Age: 80
End: 2025-08-05
Payer: MEDICARE

## 2025-08-05 DIAGNOSIS — R97.20 ELEVATED PSA: ICD-10-CM

## 2025-08-05 LAB — PSA SERPL-MCNC: 11.5 NG/ML (ref 0–4)

## 2025-08-05 PROCEDURE — 36415 COLL VENOUS BLD VENIPUNCTURE: CPT

## 2025-08-05 PROCEDURE — 84153 ASSAY OF PSA TOTAL: CPT

## 2025-08-06 ENCOUNTER — RESULTS FOLLOW-UP (OUTPATIENT)
Dept: FAMILY MEDICINE CLINIC | Age: 80
End: 2025-08-06
Payer: MEDICARE

## 2025-08-06 DIAGNOSIS — R97.20 ELEVATED PSA: Primary | ICD-10-CM

## 2025-08-27 ENCOUNTER — OFFICE VISIT (OUTPATIENT)
Dept: UROLOGY | Facility: CLINIC | Age: 80
End: 2025-08-27
Payer: MEDICARE

## 2025-08-27 ENCOUNTER — LAB (OUTPATIENT)
Dept: LAB | Facility: HOSPITAL | Age: 80
End: 2025-08-27
Payer: MEDICARE

## 2025-08-27 VITALS — RESPIRATION RATE: 16 BRPM | WEIGHT: 201.72 LBS | BODY MASS INDEX: 29.88 KG/M2 | HEIGHT: 69 IN

## 2025-08-27 DIAGNOSIS — R97.20 ELEVATED PROSTATE SPECIFIC ANTIGEN (PSA): ICD-10-CM

## 2025-08-27 DIAGNOSIS — R97.20 ELEVATED PROSTATE SPECIFIC ANTIGEN (PSA): Primary | ICD-10-CM

## 2025-08-27 PROBLEM — Z79.899 OTHER LONG TERM (CURRENT) DRUG THERAPY: Status: RESOLVED | Noted: 2021-09-16 | Resolved: 2025-08-27

## 2025-08-27 LAB — PSA SERPL-MCNC: 9.44 NG/ML (ref 0–4)

## 2025-08-27 PROCEDURE — 84153 ASSAY OF PSA TOTAL: CPT

## 2025-08-27 PROCEDURE — 36415 COLL VENOUS BLD VENIPUNCTURE: CPT
